# Patient Record
Sex: FEMALE | Race: WHITE | NOT HISPANIC OR LATINO | Employment: FULL TIME | URBAN - METROPOLITAN AREA
[De-identification: names, ages, dates, MRNs, and addresses within clinical notes are randomized per-mention and may not be internally consistent; named-entity substitution may affect disease eponyms.]

---
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
## 2024-01-10 ENCOUNTER — VIRTUAL VISIT (OUTPATIENT)
Dept: PSYCHIATRY | Facility: CLINIC | Age: 19
End: 2024-01-10
Attending: PSYCHIATRY & NEUROLOGY
Payer: COMMERCIAL

## 2024-01-10 DIAGNOSIS — F41.1 GENERALIZED ANXIETY DISORDER: ICD-10-CM

## 2024-01-10 DIAGNOSIS — F33.42 MAJOR DEPRESSIVE DISORDER, RECURRENT, IN FULL REMISSION (H): Primary | ICD-10-CM

## 2024-01-10 PROBLEM — F41.9 ANXIETY: Status: ACTIVE | Noted: 2021-03-25

## 2024-01-10 PROBLEM — F33.1 MAJOR DEPRESSIVE DISORDER, RECURRENT, MODERATE (H): Status: ACTIVE | Noted: 2021-01-13

## 2024-01-10 PROBLEM — F32.A DEPRESSION, UNSPECIFIED DEPRESSION TYPE: Status: ACTIVE | Noted: 2021-01-13

## 2024-01-10 PROCEDURE — 90792 PSYCH DIAG EVAL W/MED SRVCS: CPT | Mod: 95

## 2024-01-10 RX ORDER — HYDROXYZINE PAMOATE 25 MG/1
25-50 CAPSULE ORAL 2 TIMES DAILY PRN
Qty: 120 CAPSULE | Refills: 3 | Status: SHIPPED | OUTPATIENT
Start: 2024-01-10

## 2024-01-10 RX ORDER — VENLAFAXINE HYDROCHLORIDE 150 MG/1
150 CAPSULE, EXTENDED RELEASE ORAL DAILY
Qty: 30 CAPSULE | Refills: 3 | Status: SHIPPED | OUTPATIENT
Start: 2024-01-10 | End: 2024-05-22

## 2024-01-10 ASSESSMENT — PATIENT HEALTH QUESTIONNAIRE - PHQ9
SUM OF ALL RESPONSES TO PHQ QUESTIONS 1-9: 9
10. IF YOU CHECKED OFF ANY PROBLEMS, HOW DIFFICULT HAVE THESE PROBLEMS MADE IT FOR YOU TO DO YOUR WORK, TAKE CARE OF THINGS AT HOME, OR GET ALONG WITH OTHER PEOPLE: SOMEWHAT DIFFICULT

## 2024-01-10 ASSESSMENT — ANXIETY QUESTIONNAIRES
6. BECOMING EASILY ANNOYED OR IRRITABLE: SEVERAL DAYS
IF YOU CHECKED OFF ANY PROBLEMS ON THIS QUESTIONNAIRE, HOW DIFFICULT HAVE THESE PROBLEMS MADE IT FOR YOU TO DO YOUR WORK, TAKE CARE OF THINGS AT HOME, OR GET ALONG WITH OTHER PEOPLE: SOMEWHAT DIFFICULT
2. NOT BEING ABLE TO STOP OR CONTROL WORRYING: MORE THAN HALF THE DAYS
1. FEELING NERVOUS, ANXIOUS, OR ON EDGE: MORE THAN HALF THE DAYS
GAD7 TOTAL SCORE: 9
7. FEELING AFRAID AS IF SOMETHING AWFUL MIGHT HAPPEN: NOT AT ALL
5. BEING SO RESTLESS THAT IT IS HARD TO SIT STILL: NOT AT ALL
4. TROUBLE RELAXING: MORE THAN HALF THE DAYS
7. FEELING AFRAID AS IF SOMETHING AWFUL MIGHT HAPPEN: NOT AT ALL
8. IF YOU CHECKED OFF ANY PROBLEMS, HOW DIFFICULT HAVE THESE MADE IT FOR YOU TO DO YOUR WORK, TAKE CARE OF THINGS AT HOME, OR GET ALONG WITH OTHER PEOPLE?: SOMEWHAT DIFFICULT
IF YOU CHECKED OFF ANY PROBLEMS ON THIS QUESTIONNAIRE, HOW DIFFICULT HAVE THESE PROBLEMS MADE IT FOR YOU TO DO YOUR WORK, TAKE CARE OF THINGS AT HOME, OR GET ALONG WITH OTHER PEOPLE: SOMEWHAT DIFFICULT
4. TROUBLE RELAXING: MORE THAN HALF THE DAYS
6. BECOMING EASILY ANNOYED OR IRRITABLE: SEVERAL DAYS
GAD7 TOTAL SCORE: 9
GAD7 TOTAL SCORE: 9
2. NOT BEING ABLE TO STOP OR CONTROL WORRYING: MORE THAN HALF THE DAYS
GAD7 TOTAL SCORE: 9
3. WORRYING TOO MUCH ABOUT DIFFERENT THINGS: MORE THAN HALF THE DAYS
5. BEING SO RESTLESS THAT IT IS HARD TO SIT STILL: NOT AT ALL
1. FEELING NERVOUS, ANXIOUS, OR ON EDGE: MORE THAN HALF THE DAYS
7. FEELING AFRAID AS IF SOMETHING AWFUL MIGHT HAPPEN: NOT AT ALL
3. WORRYING TOO MUCH ABOUT DIFFERENT THINGS: MORE THAN HALF THE DAYS

## 2024-01-10 ASSESSMENT — PAIN SCALES - GENERAL: PAINLEVEL: MILD PAIN (2)

## 2024-01-10 NOTE — PROGRESS NOTES
Virtual Visit Details    Type of service:  Video Visit     Originating Location (pt. Location): Other Safely parked car.   Distant Location (provider location):  On-site  Platform used for Video Visit: Edita

## 2024-01-10 NOTE — LETTER
January 10, 2024      Luz Webber  [address] [facility] [address]        {Comm Man dear:[id number]}          Sincerely,        Kenneth Thompson MD

## 2024-01-10 NOTE — PROGRESS NOTES
"   [facility]  NEW PATIENT EVALUATION     CARE TEAM:    PCP- Yen Jauregui  Therapist- None    Luz is a 18 year old who uses the pronouns she, her, hers.      Chief Concern     \"I'm comfortable with my meds currently, I need someone to prescribe them\"      Diagnoses     Major depressive disorder, recurrent, in full remission with seasonal pattern    NILS      Assessment     Luz Webber is a 19 yo with past psychiatric diagnosis of depression and anxiety who had been seen at the [facility] clinic, most recently was working with Dr. Low and is here today to establish MH care with adult Psychiatry after being discharged from [facility] to PCP who seems was not comfortable managing psychotropic medications, eventually referred by Dr. Hanley from [facility].     Overall appears that Veronique's depressive symptoms have been in remission over the past months and attributes this to her current medication regimen, which appears she is tolerating well without noticing any side effects.   She notices today that mood changes has been more noticeable during the winter season and we discussed recommendation of using her SAD lamp (has one at home) which she had not done this season.     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 It appears that hydroxyzine as needed has been helpful although causes some somnolence, recommended to decrease dose and agrees with recommendation. We discussed long-term use of hydroxyzine not being ideal (due to cumulative anticholinergic burden) and need may indicate underlying anxiety and that there may be room for optimizing antidepressant medication to better target anxiety.  We also discussed limiting caffeine intake and THC gummies use (see details below) as this could " contribute to heightened anxiety as well. She is open to continue this conversation in the future and politely declined resources at this time.     We discussed medication indications, risks vs benefits, side effects and alternatives including optimizing venlafaxine to better target anxiety and she expresses preference of not making medication changes today, except for decreasing hydroxyzine as she had been taking on average 50 mg BID only. Veronique and is open to continue conversation around medication changes in the near future as needed.     We discussed role of psychotherapy and this having best evidence in treatment of anxiety and depression in combination with medications and she expressed interest in engaging in psychotherapy (CBT to target anxiety and depression).   No other questions or concerns.       Future Considerations:  -Consider optimizing Venlafaxine to 225 if depressive symptoms were to re-emerge.   -Hydroxyzine has been helpful for anxiety and if this was to worsen, consider using propranolol, gabapentin or Buspar.    Psychotropic Drug Interactions:  [PSYCHCLINICDDI]  ADDITIVE SEROTONERGIC: Sumatriptan, Venlafaxine  Management: routine monitoring and patient aware of risks     MNPMP was not checked today: not using controlled substances    Risk Statements:   Treatment Risk- Risks, benefits, alternatives and potential adverse effects have been discussed and are understood.   Safety Risk-Luz did not appear to be an imminent safety risk to self or others.     Plan     1) Medications:   - Continue venlafaxine 150 mg daily  - Decrease hydroxyzine to 25-50 mg PO BID PRN, anxiety      Other:   -OTC. Vitamin D supplementation (50 mcg daily) for Vitamin D insufficiency   -Sumatriptan 50 mg PO may repeat q 2 hrs, max of 4 tabs in 24 hrs. Prescribed by PCP.     2) Psychotherapy:   None currently, indicated individual therapy for CBT with focus on anxiety/mood    3) Next due:  Labs- Routine monitoring is      Cheif complaints and Diagnosis: liver cirrhosis w/ ascites/ C.diff colitis     Subjective: patient weak, sleepy; poor oral intake; denies fever. diarrhea improved.  Marked reduction in oral intake and energy. More lethargic. Not tolerating PO medications well. requiring IV anti-emetics to be able to administer oral medications.     Review of Systems: 14 Point review of systems reviewed and reported as negative unless otherwise stated in Subjective      PHYSICAL EXAM:    T(C): 36.3 (21 @ 08:18), Max: 36.3 (21 @ 08:18)  HR: 74 (21 @ 08:18) (71 - 79)  BP: 117/52 (21 @ 08:18) (93/44 - 117/52)  RR: 16 (21 @ 08:18) (16 - 16)  SpO2: 92% (21 @ 08:18) (91% - 92%)  General: AAOx1; NAD; Frail appearing; Lethargic  Head: AT/NC  ENT: Moist Mucous Membranes; No Injury  Eyes: EOMI; PERRL  Neck: Non-tender; No JVD  CVS: RRR, S1&S2, No murmur, No edema  Respiratory: Lungs CTA B/L; Normal Respiratory Effort  Abdomen/GI: Soft, mild tenderness ascites, no guarding, no rebound, normal bowel sounds  : No bladder distention, No Rose  Extremites: No cyanosis, No clubbing, No edema  MSK: Diffuse muscle atrophy  Neuro: AAOx1, Limited exam due to lethargy  Psych: Unable to evaluate  Skin: Clean, Dry and Intact    MEDICATIONS  (STANDING):  aMIOdarone    Tablet 200 milliGRAM(s) Oral daily  lactulose Syrup 10 Gram(s) Oral daily  LORazepam   Injectable 0.25 milliGRAM(s) IV Push every 12 hours  metroNIDAZOLE  IVPB      metroNIDAZOLE  IVPB 500 milliGRAM(s) IV Intermittent every 8 hours  pantoprazole  Injectable 40 milliGRAM(s) IV Push daily  prochlorperazine   Injectable 10 milliGRAM(s) IV Push every 8 hours  rifAXIMin 550 milliGRAM(s) Oral two times a day  vancomycin    Solution 500 milliGRAM(s) Oral every 6 hours    MEDICATIONS  (PRN):  acetaminophen  Suppository .. 650 milliGRAM(s) Rectal every 8 hours PRN Temp greater or equal to 38C (100.4F)  aluminum hydroxide/magnesium hydroxide/simethicone Suspension 30 milliLiter(s) Oral every 4 hours PRN Dyspepsia  morphine  - Injectable 2 milliGRAM(s) IV Push every 4 hours PRN Severe Pain (7 - 10)  ondansetron Injectable 4 milliGRAM(s) IV Push every 6 hours PRN Nausea and/or Vomiting      Urinalysis Basic - ( 2021 05:10 )    Color: Yellow / Appearance: Clear / S.015 / pH: x  Gluc: x / Ketone: Trace  / Bili: Small / Urobili: Negative mg/dL   Blood: x / Protein: 30 mg/dL / Nitrite: Negative   Leuk Esterase: Trace / RBC: 0-2 /HPF / WBC 3-5   Sq Epi: x / Non Sq Epi: x / Bacteria: Many                          8.0    30.93 )-----------( 346      ( 2021 08:46 )             25.2     11    143  |  118<H>  |  56<H>  ----------------------------<  126<H>  3.4<L>   |  17<L>  |  1.82<H>    Ca    7.8<L>      2021 08:46  Phos  4.6       Mg     2.0         TPro  x   /  Alb  1.7<L>  /  TBili  x   /  DBili  x   /  AST  x   /  ALT  x   /  AlkPhos  x       COVID-19 PCR: NotDetec (2021 15:12)  COVID-19 PCR: NotDetec (2021 06:50)  COVID-19 PCR: NotDetec (2021 19:00)  COVID-19 PCR: NotDetec (29 Oct 2021 15:30)  COVID-19 PCR: NotDetec (26 Oct 2021 05:00)  COVID-19 PCR: NotDetec (21 Oct 2021 17:08)  COVID-19 PCR: NotDetec (27 May 2021 22:25)    CAPILLARY BLOOD GLUCOSE                                8.0    28.52 )-----------( 395      ( 2021 11:16 )             24.2     11-    145  |  118<H>  |  55<H>  ----------------------------<  155<H>  3.6   |  17<L>  |  1.88<H>    Ca    8.0<L>      2021 12:36  Phos  4.5       Mg     2.0         TPro  6.9  /  Alb  2.1<L>  /  TBili  0.5  /  DBili  x   /  AST  37  /  ALT  26  /  AlkPhos  173<H>      COVID-19 PCR: NotDetec (2021 15:12)  COVID-19 PCR: NotDetec (2021 06:50)  COVID-19 PCR: NotDetec (2021 19:00)  COVID-19 PCR: NotDetec (29 Oct 2021 15:30)  COVID-19 PCR: NotDetec (26 Oct 2021 05:00)  COVID-19 PCR: NotDetec (21 Oct 2021 17:08)  COVID-19 PCR: NotDetec (27 May 2021 22:25)    CAPILLARY BLOOD GLUCOSE          Culture - Urine (collected 2021 05:30)  Source: Catheterized Catheterized  Final Report (2021 07:53):    <10,000 CFU/mL Normal Urogenital [place]        2021 09:44    141    |  113    |  57     ----------------------------<  132    3.6     |  18     |  1.98     Ca    8.1        2021 09:44    TPro  6.9    /  Alb  2.1    /  TBili  0.5    /  DBili  x      /  AST  37     /  ALT  26     /  AlkPhos  173    2021 09:44  LIVER FUNCTIONS - ( 2021 09:44 )  Alb: 2.1 g/dL / Pro: 6.9 gm/dL / ALK PHOS: 173 U/L / ALT: 26 U/L / AST: 37 U/L / GGT: x         PT/INR - ( 2021 19:24 )   PT: 18.6 sec;   INR: 1.64 ratio         PTT - ( 2021 19:24 )  PTT:36.8 secCBC Full  -  ( 2021 09:44 )  WBC Count : 24.08 K/uL  Hemoglobin : 8.6 g/dL  Hematocrit : 26.5 %  Platelet Count - Automated : 416 K/uL  Mean Cell Volume : 96.0 fl  Mean Cell Hemoglobin : 31.2 pg  Mean Cell Hemoglobin Concentration : 32.5 gm/dL          PT/INR - ( 2021 19:24 )   PT: 18.6 sec;   INR: 1.64 ratio         PTT - ( 2021 19:24 )  PTT:36.8 sec        Assessment and Plan:        84 y/o Female PMHx of cirrhosis presents with nausea, vomiting and diarrhea. History provided by  as patient was too tired to speak. Patient was recently discharged last week from the hospital. Reports she has been having multiple episode of diarrhea daily. Reports recent antibiotics for uti. She has not had anything to eat or drink in the last 3 days. Also reports increased abdominal distention. Denies fever/chills, Chest pain, SOB, rectal bleeding, LE swelling, or other complaints at this time. As per the  she did not eat nothing for almost 2 days. She has not have any bowel movement since she has arrived in the hospital.       1.Decompensated Cirrhosis of Liver with ascites, undiagnosed cause.  Hyperammonemia  -STARTED Xifaxan  -nursing can given lactulose if no diahrrea  -patient aaox3 on admission->marked worsening lethargy and functional status with associated acute metabolic encephalopathy due to malnutrition, hyperammonia, and functional decline.   -no abdominal pain on my exam since admission ; patient just very weak; deistention +. ill  -Order Us and therapeutic/palliative parcentesisi.  -Hyperammonia; Rifaxamin started. balanc of maintaining bowel movement but not to loose or too frequent because of C Diff infection.       2-black stools reported On oral iron.   -as per nursing 2 black stools  -monitor H+H >> stable   -high risk for procedure, recco conservation care.     3-C.diff colitis  -start oral vancomycin.  -ID on board      Acute Kidney Injury  -most likely due to Dehydration   -unlikely to be Hepatorenal condition   -IVF as needed for fluid balance.   -will follow Cr level and Is/Os  -follow Nephrology consult. Hold diurectic as aptient also with poor oral intake.     h/o A fib  -on Amiodarone and Xarelto. Xarelto on hold for suspected GIB.     Gluteal hematoma. stable  -improved      SCDs for DVT ppx    Chronic Leucocytosis  -stable range of WBC    Code status: Patient is DNR/DNI. Comfort Care/Hospice requested effective 2021. MOLST form is in the chart.     Failure to thrive  -patient has severe failure to thrive, and severe malnutrition  -she is dnr/  dni however no limitations on treatment. MOLST in chart.     treatment for C Diff for symptomatic relief. Over the course of the this hospitalization, progressive cognition and functional decline. More lethargic. Poor oral intake. Increased nausea. Intermittiently unable to tolerated PO and that is progressively worsening. . Requiring IV Compazine to barely tolerated PO. Started IV Flagyl in lieu of not tolerating PO and possibly missing PO vancomycin doses. Restarting lactulose as tolerated to treat hyperammonia. IV ativan for anxiety. Given the dramatic decline over the last several days, there is an expected demise sooner than later. Tolerating less PO and now requiring parental anti-emetic to get necessary oral medications to treat acute infection. Had extensive discussion with  and daughter. Will be discussing with the family to transition to hospice care. Inpatient hospice notified of rapid expected decline and they will be reviewing eligibility. I anticipate if she goes to home hospice, she will require a transition to inpatient hospice in a possibility of a matter of days. which would make the multiple transitions not very realistic and/or comfortable.  Further discussion ->instructed to execute comfort care measures and request hospice care. Patient now requiring more IV ativan for comfort. Unable to take oral medications morning of . Referral for inpatient hospice requested.        not indicated for current psychotropic medication regimen   EKG- Routine monitoring is not indicated for current psychotropic medication regimen   Rating scales-  PHQ9, GAD7     4) Referrals:   Internal referral for CBT psychotherapy to target anxiety, mood. Intake pool alerted via Epic message.     5) Other:   -Recommended continue working with PCP for migraine management/ref to Neuro.   -Start using light box as discussed.  Update: Reached out to PCP re: continue prescribing psychotropic medications and have not heard back from them. Case was discussed with CAT2 team and it was determined that patient is appropriate for care in this clinic with the BCT team, has an upcoming appointment with Dr. Boucher on 2/7/24.       6) Follow-up: Return to clinic in 4-8 weeks with BCT team, Pending discussion in CAT2 team meeting.        Pertinent Background                                                   [most recent eval 01/09/24]     The following information was copied/edited form previous notes and reviewed with Luz today:   Luz reports that she has experienced anxiety her entire life, but symptoms noticeably worsened around middle school, at a time where she had some changes in friend groups.  She first sought therapy in October 2020, where she was diagnosed with depression and anxiety.  Both she previously thought were normally experienced by everyone.  In February of 2021, her PCP prescribed her sertraline for anxiety. Was not tolerated due to GI side effects, and was switched to fluoxetine 20 mg after 1 month.   In April and June 2021, fluoxetine was titrated up to 30 mg and 40 mg respectively as it was only partially effective for anxiety. Mom noticed significant emotional blunting, and pt was tending less to her needs (messy room, forgot to feed gecko and it passed away). Pt reported symptoms of a major depressive episode from that time. Prozac was decreased back to 30 mg, and symptoms improved although  "continued not providing adequate benefit for anxiety and depression. In 2021, was cross-titrated to venlafaxine and current dose of150 mg has worked best for her.   She was seen at the [facility] clinic since October 2021 to June 2023 and transferred to PCP/adult MH.     Pertinent items include: [N/A]     Subjective       Goals for today's visit:  \"I'm comfortable with my meds currently, I need someone to prescribe them\"   -Shares does not want to change current medication regimen as this has worked best    -Mood: \"Stable\"   -Winter season is hard in terms of not having enough recreational activities/time used to play hockey in the past, now work is primary stress.  -Happy with her job, but can be understandably stressful.  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   -Seasonal pattern to mood: \"I think past 2 years has been the case\" Not so much in the past as she had been more active during hockey season.   -Has a SAD lamp, has not used it since last winter, shares will start use   -Has been re-considering engaging in therapy in the past months   Internal, referral for psychotherapy     -Anxiety:  \"better now,\" \"definitely waves, most recently with the holidays was high\"   Gets tired, exhausting, sometimes gets headaches. Overall \"pushing through.\"   Experiences anxiety as: Mild SOB, Increased Increased HR, ~110-120 bpm, baseline HR is in the 70s range (apple watch). Notices is usually in the mid-afternoon or in the evening. A bit of hand tremors.  Hydroxyzine helps most of the time.  Panic attacks: Reports happened more in HS, shorter 10 min episodes of \"intense anxiety symptoms.\" After 10 min was more exhausted, some trailing anxiety. More after holding in my anxiety at the end of the day, never in public. Last time experience the above: Approximately in the fall. \"Not often anymore.\"     -Appetite: Ok, good.   -Energy: Generally low in the winter season  -Sleep: Usually wakes up 5:30 AM, goes to bed ~mid-night. Takes " daily naps form 1-3 pm.   -Has back pain due to past sports injuries.   -Difficulty with initiating sleep sometimes.     -Tremor/abnormal movements: Denies   -Side effects? Chest pain, palpitations, sleep disturbances, Other side effects:   -Perceptual disturbances: Denies   -Safety: Denies SI/plan/intent, denies HI or other safety concerns      Recent Psych Symptoms:   Depression:  low energy, insomnia, and hypersomnia  Elevated:  none  Psychosis:  none  Anxiety:  excessive worry although this has been better after the holidays. Currently denies worriees out of the ordinary   Trauma Related:  None. Denies hx of trauma.  Other:  No    Current Social History:  Financial/occupational: Self-employed. Ownes a coffee shop, has 7 employees.  Living situation (partner, children, pets, etc):  Lives at parents home. Has an older (half-sister, 26 yo, finishing grad school in psychology, sex therapist) and a younger sister (15 yo). Lives with bio mom and dad and younger sisters.   Social/spiritual support: sisters, mom, some friends.   Feels safe at home: Yes    Pertinent Substance Use:   Alcohol: Yes: Casual glass of wine with family during the holidays.    Cannabis: Yes: 1 THC gummy (5 mg) once a week, given by her dad. Denies problematic use, cravings, noticing impact in mood or anxiety or other concerns currently. Continue to monitor closely/offer resources as needed.   Tobacco: No  Caffeine:  Yes: Drinks 4 cups a day. Latest has a cup is ~7-8 pm. Recommended to avoid after noon-2 pm    Opioids: No   Narcan Kit current: No  Other substances: none    Medical Review of Systems:   Lightheadedness/orthostasis: None  Headaches: Migraine headaches. Also reports runs in her family. Gets 3-5 episodes/month. Started after concussion in HS when playing hockey. LOC for ~5 seconds once. Never imaging or needing to go to the hospital. No seizure hx.  GI: none  Sexual health concerns: Denies being sexually activive     A comprehensive  "review of systems was performed and is negative other than noted above.    Contraception: None     Mental Status Exam     Alertness: alert  and oriented  Appearance: well groomed  Behavior/Demeanor: cooperative, pleasant, and calm, with good  eye contact   Speech: regular rate and rhythm  Language: intact and no problems  Psychomotor: normal or unremarkable  Mood: \"stable\"   Affect: full range; congruent to: mood- yes, content- yes  Thought Process/Associations: unremarkable  Thought Content:  Reports none;  Denies suicidal & violent ideation and delusions, delusions , obsessions , phobia , magical thinking, over-valued ideas, and paranoid ideation  Perception:  Reports none;  Denies auditory hallucinations, visual hallucinations, visual distortion seen as shadows , depersonalization, and derealization  Insight: good  Judgment: good  Cognition: does  appear grossly intact; formal cognitive testing was not done  Gait and Station: N/A (telehealth)     Social History                                 pt reported     Financial/employment: 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   Living situation/family: mother (Radha), father and younger sister. Gets along well with family members. Also has older half-sister who she is close to.   Children: No  Social/spiritual support: See above for current.  Education: Completed HS, Completed PSEO at [employer]. Reports getting \"decent\" grades, in good academic standing. No history of 504 or IEP.   Early history:   Raised by: biological parents   Legal:  Denies      Family Mental Health History                                 pt reported     Mother: MDD, anxiety  Dad: Seasonal depression, anxiety.   Denies hx of bipolar disorder or psychotic disorders   Paternal  grandparent some \"heart issues.      Past Psychiatric History       Self injurious behavior [method, most recent]: No  Suicide attempt [#, most recent, method]: No  Suicidal ideation hx [passive, " active]: No    Violence/Aggression Hx:No   Psychosis Hx: No   Eating Disorder Hx: No  Trauma hx: No    Psych Hosp [#, most recent]: No   Commitment: No   TMS/ECT: No   Outpatient Programs [Day treatment, DBT, eating disorder tx, etc]: No    SUBSTANCE USE HISTORY   Past Use: No  Treatment [#, most recent]: No   Medical Consequences: No   Legal Consequences: No      Past Psych Med Trials        Medication Max Dose (mg) Dates / Duration Helpful? DC Reason / Adverse Effects?   Sertraline   For 1 month Feb 2020  N caused GI upset, discontinued    Fluoxetine   Mar - Oct 2020  Partially  caused nausea, only partially helpful for anxiety   Venlafaxine 150 mg  October 2021-present  Y       Vitals   There were no vitals taken for this visit.  Pulse Readings from Last 3 Encounters:   09/15/23 97   05/26/23 101   12/16/21 72     Wt Readings from Last 3 Encounters:   09/15/23 53.4 kg (117 lb 11.2 oz) (30%, Z= -0.53)*   05/26/23 51.7 kg (113 lb 14.4 oz) (23%, Z= -0.73)*   12/16/21 50.8 kg (112 lb) (25%, Z= -0.67)*     * Growth percentiles are based on CDC (Girls, 2-20 Years) data.     BP Readings from Last 3 Encounters:   09/15/23 118/77   05/26/23 137/88   12/16/21 120/82 (83%, Z = 0.95 /  96%, Z = 1.75)*     *BP percentiles are based on the 2017 AAP Clinical Practice Guideline for girls        Medical History     ALLERGIES: Patient has no known allergies.    Patient Active Problem List   Diagnosis    Acne vulgaris    Anxiety    Depression, unspecified depression type    Encounter for preventive care    Spondylolysis    Migraine with aura and with status migrainosus, not intractable    Medication monitoring encounter    Kyphosis (acquired) (postural)        Medications     Current Outpatient Medications   Medication Sig Dispense Refill    adapalene (DIFFERIN) 0.1 % external cream [ADAPALENE (DIFFERIN) 0.1 % CREAM] Apply to affected area nightly 45 g 1    cholecalciferol, vitamin D3, (VITAMIN D3 ORAL) [CHOLECALCIFEROL, VITAMIN  "D3, (VITAMIN D3 ORAL)] Take by mouth.      hydrOXYzine (VISTARIL) 25 MG capsule Take 2-3 capsules (50-75 mg) by mouth 2 times daily as needed for anxiety 60 capsule 1    SUMAtriptan (IMITREX) 50 MG tablet Take 1 tablet (50 mg) by mouth at onset of headache for migraine May repeat in 2 hours. Max 4 tablets/24 hours. 12 tablet 3    venlafaxine (EFFEXOR XR) 150 MG 24 hr capsule Take 1 capsule (150 mg) by mouth daily 30 capsule 0        Labs and Data         6/27/2023     1:50 PM   PROMIS-10 Total Score w/o Sub Scores   PROMIS TOTAL - SUBSCORES 23          No data to display                  5/26/2023     3:04 PM 6/27/2023     1:49 PM 9/15/2023     1:13 PM   PHQ-9 SCORE   PHQ-9 Total Score MyChart 6 (Mild depression) 8 (Mild depression) 7 (Mild depression)   PHQ-9 Total Score 6 8 7         6/9/2021     3:00 PM 6/29/2021     1:00 PM 5/26/2023     3:05 PM   NILS-7 SCORE   Total Score   10 (moderate anxiety)   Total Score 10 11 10       Liver/Kidney Function, TSH Metabolic Blood counts   Recent Labs   Lab Test 09/15/23  1405 06/01/23  1051   AST 24 20   ALT 16 16   ALKPHOS 54 61   CR 0.70 0.78     Recent Labs   Lab Test 03/19/21  1530   TSH 1.31    Recent Labs   Lab Test 06/01/23  1051   CHOL 179*   TRIG 64   LDL 97   HDL 69     No lab results found.  Recent Labs   Lab Test 09/15/23  1405   GLC 89    Recent Labs   Lab Test 09/15/23  1405   WBC 5.2   HGB 13.7   HCT 39.0   MCV 82             October/2021 ECG  QTc = 463 ms, followed by 12-day Holer study for \"irregular heartbeat\" with normal results, per records.          PROVIDER: Kenneth Thompson MD    Level of Medical Decision Making:   - At least 1 chronic problem that is not stable  - Engaged in prescription drug management during visit (discussed any medication benefits, side effects, alternatives, etc.)      Patient staffed in clinic with Dr. Peterson who will sign the note.  Supervisor is Dr. Schmitz.    "

## 2024-01-10 NOTE — NURSING NOTE
Is the patient currently in the state of [place]? YES    Visit mode:VIDEO    If the visit is dropped, the patient can be reconnected by: VIDEO VISIT: Text to cell phone:   Telephone Information:   Mobile [phone]       Will anyone else be joining the visit? NO  (If patient encounters technical issues they should call [phone]    How would you like to obtain your AVS? MyChart    Are changes needed to the allergy or medication list? No    Reason for visit: Consult    Raeann GARSIA

## 2024-01-10 NOTE — Clinical Note
Ephraim Jauregui, Dr. Thompson and I saw Veronique today for a new eval as she gradutated from the child psychiatry clinic. We noted that she is on a stable long term med regimen consisting of just venlafaxine and hydroxyzine which she uses infrequently. The previous team had recommended that she was no longer in need of dedicated psychiatric care and we agree with this recommendation. Generally with patients that are stable over the long term and without complicated meds, we recommend that PCP take over the prescribing which allows us to see more complicated patients in our specialty clinic.   We support a collaborative care model to help support PCPs during this transition. We provide contingency recommendations and are available to contact for up to a year after the pts last visit, and if any major issues occur, the patient is invited to re-establish in our clinic.   Additionally, Veronique would prefer to continue with you in this capacity if possible.  Please let us know if you can accommodate this request.

## 2024-01-10 NOTE — PATIENT INSTRUCTIONS
**For crisis resources, please see the information at the end of this document**   Patient Education    Thank you for coming to the [facility] MENTAL HEALTH & ADDICTION [place] CLINIC.     Lab Testing:  If you had lab testing today and your results are reassuring or normal they will be mailed to you or sent through [employer] within 7 days. If the lab tests need quick action we will call you with the results. The phone number we will call with results is # [phone]. If this is not the best number please call our clinic and change the number.     Medication Refills:  If you need any refills please call your pharmacy and they will contact us. Our fax number for refills is [phone].   Three business days of notice are needed for general medication refill requests.   Five business days of notice are needed for controlled substance refill requests.   If you need to change to a different pharmacy, please contact the new pharmacy directly. The new pharmacy will help you get your medications transferred.     Contact Us:  Please call [phone] during business hours (8-5:00 M-F).   If you have medication related questions after clinic hours, or on the weekend, please call [phone].     Financial Assistance [phone]   Medical Records [phone]       MENTAL HEALTH CRISIS RESOURCES:  For a emergency help, please call 911 or go to the nearest Emergency Department.     Emergency Walk-In Options:   EmPATH Unit @ [facility] ([place]): [phone] - Specialized mental health emergency area designed to be calming  [facility] ([place]): [phone]  [facility] Acute Psychiatry Services ([place]): [phone]  [facility]): [phone]    [facility] Crisis Information:   [place]: [phone]  [place]: [phone]  [place] ([place]) - Adult: [phone]     Child: [phone]  [place] - Adult: [phone]     Child: [phone]  [place]:  [phone]  List of all [facility] resources:   https://[place].gov/dhs/people-we-serve/adults/health-care/mental-health/resources/crisis-contacts.jsp    National Crisis Information:   Crisis Text Line: Text  [place]  to 034774  Suicide & Crisis Lifeline: 988  National Suicide Prevention Lifeline: 4-206-214-TALK (1-278.780.5227)       For online chat options, visit https://suicidepreventionlifeline.org/chat/  Poison Control Center: 7-233-265-8250  Trans Lifeline: 8-561-775-9152 - Hotline for transgender people of all ages  The Kt Project: 6-737-439-5321 - Hotline for LGBT youth     For Non-Emergency Support:   Fast Tracker: Mental Health & Substance Use Disorder Resources -   https://www.[employer] Traderck[employer] my eyesn.org/

## 2024-01-23 ENCOUNTER — VIRTUAL VISIT (OUTPATIENT)
Dept: PSYCHIATRY | Facility: CLINIC | Age: 19
End: 2024-01-23
Attending: PSYCHOLOGIST
Payer: COMMERCIAL

## 2024-01-23 DIAGNOSIS — F33.41 RECURRENT MAJOR DEPRESSIVE DISORDER, IN PARTIAL REMISSION (H): Primary | ICD-10-CM

## 2024-01-23 DIAGNOSIS — F41.1 GAD (GENERALIZED ANXIETY DISORDER): ICD-10-CM

## 2024-01-23 PROCEDURE — 90791 PSYCH DIAGNOSTIC EVALUATION: CPT | Mod: 95

## 2024-01-23 ASSESSMENT — PATIENT HEALTH QUESTIONNAIRE - PHQ9
SUM OF ALL RESPONSES TO PHQ QUESTIONS 1-9: 10
10. IF YOU CHECKED OFF ANY PROBLEMS, HOW DIFFICULT HAVE THESE PROBLEMS MADE IT FOR YOU TO DO YOUR WORK, TAKE CARE OF THINGS AT HOME, OR GET ALONG WITH OTHER PEOPLE: SOMEWHAT DIFFICULT
SUM OF ALL RESPONSES TO PHQ QUESTIONS 1-9: 10

## 2024-01-23 NOTE — Clinical Note
"I wonder if the pt meets\" partial remission\" of MDD or MDD recurrent?? Id like to ask you about this at the next supervision"

## 2024-01-25 NOTE — PROGRESS NOTES
"Department of Psychiatry   [facility]  Non-Medical Diagnostic Evaluation      Date of Service: Jan 23, 2024  Time of interview with patient: Start Time: 08:30 Stop Time: 09:30  Provider:  Genoveva Duoglas MA, BERNARDO, Zahra practicum student   Psychiatrist: None  PCP: Yen Jauregui  Other Providers: Psychiatrist Dr. Kate Calhoun  Referred by Dr. Kate Calhoun    Telehealth Video Visit AddOn  Type of service: Telemedicine Office Visit for psychological Diagnostic evaluation .  The patient's condition can be safely assessed and treated via synchronous audio and visual telemedicine encounter.    Mode of Communication:  Video Conference via  StepUp  Reason for Telemedicine Visit: Patient has requested telehealth visit   Originating Site (Patient Location): Patient's home  Distant Site (Provider Location): [facility]: [facility] Adult Psychiatry [facility]  Consent:  The patient has verbally consented to: the potential risks and benefits of telemedicine versus in person care with special emphasis on confidentiality given family members in the home.  Attestation: As the provider I attest to compliance with applicable laws and regulations related to telemedicine.     Identifying Data:  Luz Webber is a 18 year old female who prefers the name of \"Veronique\" & pronouns she, her, hers, herself.     Sources of Information: gathered by clinical interview, self-report forms, and chart review.  The patient was a good historian.    Encounter Diagnoses   Name Primary?    Recurrent major depressive disorder, in partial remission (H24) Yes    Generalized anxiety disorder with panic attacks      CHIEF COMPLAINT     \"I experience anxiety throughout the day and have difficulty with managing it. I don't know what triggers anxiety or what causes the anxiety. I have depression as well, which is 'okay' compared to anxiety\"      HISTORY OF PRESENT ILLNESS        The pt explained that she started having the symptoms " of depression and anxiety when she was in middle school. The parents noticed the pt's struggle in 2020 when she was age 15 and took her to see their family practice provider. The pt started an antidepressant prescribed by her family practice provider initially, then, started individual psychotherapy. The pt reported that her anxiety and depression were mostly related to her academic and sports performance. The pt also stated that she had social anxiety with panic attacks back then. The pt worked with a CBT psychotherapist from 2020 to 2021. The pt stated that the therapy was effective for her to learn coping skills. 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 The pt has experienced increased anxiety symptoms for the past few months. She stated that she does not know what triggers anxiety currently, but her relationship with her older step-sister may be contributing. The pt explained that she looks up to her older step-sister and wants to have active relationship, however, her step-sister is not actively participating in their relationship lately. The pt would like to understand what her anxiety is about and learn to better manage symptoms. She reflected that being a business owner can be stressful but she loves her job.     PSYCHIATRIC AND DIAGNOSTIC INTERVIEW     The [employer] Neuropsychiatric Interview was completed to aid in diagnostic assessment.    Depression: The pt meets the diagnostic criteria of Major Depressive Disorder in partial remission. For the past two weeks, Luz Webber reports the symptoms that meets 4 criteria of Major Depressive disorder including lack of interest and less enjoyment the things she used to enjoy, difficulties with staying asleep, low energy, and feeling bad about self. Patient  denied suicide plan and intent.    Sleep: The pt reports difficulty with falling asleep and staying asleep.     Eating Disorder: The pt denies current or past symptoms of eating disorder.     History of Depression (prior episodes): Patient recalls first feeling depressed around age 13. The pt reported that she feels she was not doing well in school both academically and socially. The pt did not share her struggle with anyone at first. The patient's parents noticed her struggle when she was 15 year old. The pt initially was treated with an antidepressant and then worked with a psychotherapist utilizing CBT from 2020 to 2021. The pt found the therapy was helpful to learn coping skills. The pt also stated that working for a coffee shop improved her social skills.     Gina/hypomania: The pt denies current or past symptoms of gina or hypomania.     Panic: The pt reported that her last episode of a panic attack was in high school.   Symptoms include: Patient reports palpitations, diaphoresis (excessive sweating), tremors, GI distress, flushing. Patient denied worries about future attacks, consequences of attacks, and did not report any changes to behavior due to attacks.     Agoraphobia: The pt denies current or past symptoms of agoraphobia.    Social anxiety: The pt denies current symptoms of social anxiety. The pt reported that the peak of her social anxiety was freshman year in high school.     Obsessions: The pt denies current or past symptoms of obsessions.    Compulsions: The pt denies current or past symptoms of compulsion.    Trauma history:  The pt reported that her father had a heart issue that resulted in a serious MVA. The experience (receiving the call, coordinating the logistics of visiting him, taking care of insurance process, the uncertainty of the father's prognosis) was traumatic for her. Does not meet criteria for current PTSD.     Generalized Anxiety: The pt meets the diagnostic criteria of  generalized anxiety disorder. The pt reported excessive worry about several routine things, anxieties and worries present most days, and difficulty controlling worries. During times of anxiety, pt endorsed feeling restless/ on edge, muscle tension, tired/ easily exhausted, mind going blank, irritability, insomnia. The pt shared that her triggers of anxiety are unknown, however, she acknowledges that she is more concerned about others' wellbeing than her own. She also focuses on how her behaviors are seen by others (whether she is disappointing to other people).     Psychosis:  Pt denied delusions, auditory hallucinations with/without commands, and  visual hallucinations, disorganized behavior, disorganized speech (difficulty communicating).    Substance use history:   The pt denies current or past symptoms of substance use disorder.    Alcohol: none    Tobacco (Smoking/Vaping): none    Cannabis: none    Other Illicit Drugs: none    Attention Deficit Hyperactivity Disorder: The pt denies current or past symptoms of ADHD.    Antisocial Personality: The pt denies current or past symptoms of personality disorders.    SAFETY ASSESSMENT     Suicide:  Level of immediate risk: Low  Ideation/Plan/Intent: denies  Risk Factors: Luz Webber has the following risk factors for self-harm severe anxiety   Deterrents: Risk is mitigated by no h/o suicide attempt, no plan or intent, no h/o risky impulsive behavior, no access to lethal means, h/o seeking help when needed, future oriented, none to minimal alcohol use , commitment to family, stable housing, and good job situation .  Self-injurious Behaviors [method, most recent]: none reported  Suicide Attempt [#, recent, method]: none reported    Homicide/Violence:  Assessed level of immediate risk: None  Denies ideation, plan, and intent.    PSYCHIATRIC AND SUBSTANCE USE TREATMENT HISTORY     Current psychiatric medications: hydroxyzine 25mg BID PRN, Effexor XR 15mg QD   Patient  did not report any changes to medications    Current major medical issues: see notes by medical providers.    Psychiatric treatment history:   Psych Inpatient Hospitalizations [#, most recent]: none  ECT [#, most recent]: none  Outpatient Programs [DBT, Day Treatment, Eating Disorder Tx, etc, IOP]: none  Individual Therapy: from 2021 to 2021    Substance use treatment history (e.g., individual/group psychotherapy, antabuse, MAT, residential, AA/NA): none    SOCIAL AND FAMILY HISTORY     Current Living Situation/Family Relationships: Lives with her parents and younger sister  Guns at home?: none  Exercise: minimum. The pt reports her work involves good amount of physical activity  Children: none  Marital status: single  Occupation/ Financial Support: employed as a a small business owner  Cultural/ Social/ Spiritual/ Episcopal Support: The pt identified as cis gender adhikari female. The pt is unsure about the support from her mother about her identity.   Legal History: none    Upbringing: Luz Webber was born and raised - together with her younger sister (5 years younger than the pt) - by both parents in [place], [place]. The pt stated that her parents were around age 19 when she was born. The pt reflected that her parents may have been too young (emotionally and financially not well equipped) to provide what the pt needed while she was growing up. The pt stated that she was left with too much responsibility for her age (e.g., being responsible to take care of her younger sister).    Development: met developmental milestones on time.  Cultural influences: Luz Webber identifies as a adhikari female. Luz Webber reports that to be taken into cultural considerations when providing treatment.  Life Events/Stressors: The pt stated that she is unsure about her stressors.   Head injuries: a couple of times during her school years from hockey  Academic: high school graduate.     Family psychiatric history: The pt is  not aware of any mental health related issues in her family.    MENTAL STATUS EXAM                                                                                       Alertness: alert   Appearance: adequately groomed  Behavior/Demeanor: cooperative, with good  eye contact   Speech: normal Intact. Normal volume, marshal. No prosody.  Language: intact. Preferred language identified as English.  Psychomotor: normal or unremarkable  Mood: anxious  Affect: full range; was congruent to mood; was congruent to content  Thought Process/Associations: unremarkable  Thought Content:  Reports none;  Denies suicidal and violent ideation, delusions, preoccupations, obsessions , phobia , magical thinking, over-valued ideas, and paranoid ideation  Perception:  Reports none;  Denies auditory hallucinations, visual hallucinations, visual distortion seen as shadows , depersonalization, and derealization; intact   Insight: good  Judgment: good  Cognition: (6) does  appear grossly intact; formal cognitive testing was not done  Gait and Station:  unable to assess    ASSESSMENT DATA                                                                                          1/10/2024     7:47 AM 1/10/2024     8:14 AM 1/23/2024     6:41 AM   PHQ   PHQ-9 Total Score 9 9 10   Q9: Thoughts of better off dead/self-harm past 2 weeks Not at all Not at all Not at all      rated by patient as somewhat difficult      5/26/2023     3:05 PM 1/10/2024     8:00 AM 1/10/2024     8:15 AM   NILS-7 SCORE   Total Score 10 (moderate anxiety)  9 (mild anxiety)   Total Score 10 9 9        ASSESSMENT SUMMARY     Luz Webber is a 18 year old single White Not  or  female with psychiatric history of chronic and persistent anxiety and depression who presented for a comprehensive assessment of psychiatric symptoms in the Department of Psychiatry.  Veronique was referred by Dr. Kate Calhoun. Veronique presented today as a good historian. Veronique has  "adequate insight in her current circumstances. Diagnosis of Generalized Anxiety Disorder and Major Depressive Disorder partial remission seem to be supported by patient report, collateral records, and the MINI 7.0.2.  Differential diagnosis of Adjustment Disorder and Social Anxiety Disorder. Further diagnostic clarification is needed. There are medical comorbidities which impact this treatment such as chronic back and shoulder pain.     The patient was first diagnosed and treated for Major Depressive Disorder and Generalized Anxiety Disorder at age 15. Veronique describes her depression as currently better managed than her anxiety symptoms. Primary symptoms include persistent worries on several routine things on almost daily basis, muscle tension, restlessness, and sleep difficulties.     Early childhood is notable for \"needed to grow up fast\" according to the pt. The pt described that she needed to do things well otherwise she would get yelled at by her parents. The pt reported difficulty with socialization during her school years as well as \"perfectionistic tendency\" in her academic and sport performance.      Psychosocial factors impacting treatment include Parent/Child Relationship Difficulties. Psychosocial stressors were identified as family of origin issues, limited social support, mental health symptoms, occupational / vocational stress, and parent-child stress. Patient has evidence of functional impairment including difficulty with home-family and social-friends. More specifically, she is aware that her social support is limited and would like to expand. Goal is to increase her functioning detailed above and assist her to make progress towards her goals.  Patient identified the following strengths that will help her succeed in recovery: has some insight, has family support, is medically insured, has a stable living environment, has mental health providers in place, able to seek help when in crisis, is future " oriented, and is gainfully employed. Things that may interfere with their success include  limited social support .    Starting at age 15, the patient has tried a number of different psychiatric medications and therapy for both depression and anxiety with positive benefit. Patient agrees to treatment with cognitive behavior therapy with the capacity to do so. Agrees to call clinic for any problems. The patient understands to call 911 or come to the nearest ED if life threatening or urgent symptoms present.     Plan     - Weekly cognitive behavioral therapy with writer.  - RTC: in 2 weeks    Genoveva Douglas    I did not see this pt directly. This pt was discussed with me in individual psychotherapy supervision, and I agree with the plan as documented.     Asha Olea, Ph.D., L.P.        Answers submitted by the patient for this visit:  Patient Health Questionnaire (Submitted on 1/23/2024)  If you checked off any problems, how difficult have these problems made it for you to do your work, take care of things at home, or get along with other people?: Somewhat difficult  PHQ9 TOTAL SCORE: 10

## 2024-01-30 NOTE — PATIENT INSTRUCTIONS
It was a pleasure getting to meet you both today. Here is the cross-taper schedule for switching Prozac to Effexor:    Week 1: starting today  - Decrease Prozac to 20 mg daily  - Start Effexor 37.5 mg daily    Week 2:  - Decrease Prozac to 10 mg daily  - Increase Effexor to 75 mg (TWO 37.5 mg capsules) daily    Week 3:  - Discontinue Prozac 10 mg daily  - Increase Effexor to 112.5 mg (THREE 37.5 mg capsules) daily    Week 4+:  - Increase Effexor to 150 mg (FOUR 37.5 mg capsules) daily.    Our initial target dose will be for 150 mg daily. We will see if there's any improvement after 4-6 weeks of being on 150 mg. If needed, we can consider increasing to 187.5 mg or 225 mg daily in the future.    Please do not hesitate to reach out via [employer] if you have any questions or concerns after starting the cross-taper.      **For crisis resources, please see the information at the end of this document**     Patient Education      Thank you for coming to the [facility] MENTAL HEALTH & ADDICTION [place] CLINIC.    Lab Testing:  If you had lab testing today and your results are reassuring or normal they will be mailed to you or sent through [employer] within 7 days. If the lab tests need quick action we will call you with the results. The phone number we will call with results is # [phone] (home) . If this is not the best number please call our clinic and change the number.    Medication Refills:  If you need any refills please call your pharmacy and they will contact us. Our fax number for refills is [phone]. Please allow three business for refill processing. If you need to  your refill at a new pharmacy, please contact the new pharmacy directly. The new pharmacy will help you get your medications transferred.     Scheduling:  If you have any concerns about today's visit or wish to schedule another appointment please call our office during normal business hours [phone] (8-5:00 M-F)    Contact  Us:  Please call [phone] during business hours (8-5:00 M-F).  If after clinic hours, or on the weekend, please call  [phone].    Financial Assistance [phone]  [facility] Billing [phone]  [place] Billing Office, [facility]: [phone]  [place] Billing [phone]  Medical Records [phone]  [place] Patient Bill of Rights [email or web address]       MENTAL HEALTH CRISIS NUMBERS:  For a medical emergency please call  911 or go to the nearest ER.     [facility]:   [facility] -[phone]   Crisis Residence [facility] Residence -[phone]   Walk-In Counseling [facility] -[phone]   COPE 24/7 [place] Mobile Team -[phone] (adults)/[phone] (child)  [facility]: [facility] - [phone]      [facility]:   [facility] - [phone]   Walk-in counseling [facility] - [phone]   Walk-in counseling [facility] - [phone]   Crisis Residence [facility] Residence - [phone]  Urgent Care Adult Mental Cpkgpv-[phone] mobile unit/ 24/7 crisis line    National Crisis Numbers:   National Suicide Prevention Lifeline: 5-789-927-TALK (077-276-7393)  Poison Control Center - 7-417-838-1315  [employer] [place].Android App Review Source/resources for a list of additional resources ([place])  Trans Lifeline a hotline for transgender people 1-538.668.8501  The Kt Project a hotline for LGBT youth 1-951.506.3234  Crisis Text Line: For any crisis 24/7   To: 176382  see www.crisistextline.org  - IF MAKING A CALL FEELS TOO HARD, send a text!         Again thank you for choosing [facility] MENTAL HEALTH & ADDICTION [facility] [[email or web address]] and please let us know how we can best partner with you to improve you and your family's health.    You may be receiving a survey regarding this appointment. We would love to have your feedback, both positive and  negative. The survey is done by an external company, so your answers are anonymous.            - - -

## 2024-02-07 ENCOUNTER — VIRTUAL VISIT (OUTPATIENT)
Dept: PSYCHIATRY | Facility: CLINIC | Age: 19
End: 2024-02-07
Attending: PSYCHIATRY & NEUROLOGY
Payer: COMMERCIAL

## 2024-02-07 VITALS — HEIGHT: 63 IN | WEIGHT: 115 LBS | BODY MASS INDEX: 20.38 KG/M2

## 2024-02-07 DIAGNOSIS — F41.1 GAD (GENERALIZED ANXIETY DISORDER): Primary | ICD-10-CM

## 2024-02-07 DIAGNOSIS — F33.42 MAJOR DEPRESSIVE DISORDER, RECURRENT, IN FULL REMISSION (H): ICD-10-CM

## 2024-02-07 PROCEDURE — 99214 OFFICE O/P EST MOD 30 MIN: CPT | Mod: 95

## 2024-02-07 ASSESSMENT — PAIN SCALES - GENERAL: PAINLEVEL: NO PAIN (0)

## 2024-02-07 NOTE — PROGRESS NOTES
"   [facility]  TRANSFER of CARE DIAGNOSTIC ASSESSMENT     CARE TEAM:    PCP- Yen Jauregui  Therapist- None    Luz is a 19 year old who uses the pronouns she, her, hers.      Chief Concern     \"I'm comfortable with my meds currently and I'm doing well mentally\"      Diagnoses     Major depressive disorder, recurrent, in full remission with seasonal pattern    NILS      Assessment     Luz Webber is a 18 yo with past psychiatric diagnosis of depression and anxiety who had been seen at the [facility] clinic, most recently was working with Dr. Low and is here today to establish MH care with adult Psychiatry after being discharged from [facility] to PCP who seems was not comfortable managing psychotropic medications, eventually referred by Dr. Hanley from [facility].     Overall appears that Ernestinas depressive symptoms have been in remission over the past months and attributes this to her current medication regimen, which appears she is tolerating well without noticing any side effects.   She notices today that mood changes has been more noticeable during the winter season and we discussed recommendation of using her SAD lamp (has one at home) which she had not done this season.     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 It appears that hydroxyzine as needed has been helpful although causes some somnolence, recommended to decrease dose and agrees with recommendation. We discussed long-term use of hydroxyzine not being ideal (due to cumulative anticholinergic burden) and need may indicate underlying anxiety and that there may be room for optimizing antidepressant medication to better target anxiety.  We also discussed limiting caffeine intake and THC gummies use (see details below) as this " could contribute to heightened anxiety as well. She is open to continue this conversation in the future and politely declined resources at this time.     We discussed medication indications, risks vs benefits, side effects and alternatives including optimizing venlafaxine to better target anxiety and she expresses preference of not making medication changes today. She takes hydroxyzine only 1-2 times per week. Veronique and is open to continue conversation around medication changes in the near future as needed.     We discussed role of psychotherapy and this having best evidence in treatment of anxiety and depression in combination with medications and she expressed interest in engaging in psychotherapy (CBT to target anxiety and depression).   No other questions or concerns.       Future Considerations:  -Consider optimizing Venlafaxine to 225 if depressive symptoms were to re-emerge.   -Hydroxyzine has been helpful for anxiety and if this was to worsen, consider using propranolol, gabapentin or Buspar.  -Planning to find a psychiatrist for her in the community if she remains stable in the next visit.    Psychotropic Drug Interactions:  [PSYCHCLINICDDI]  ADDITIVE SEROTONERGIC: Sumatriptan, Venlafaxine  Management: routine monitoring and patient aware of risks     MNPMP was not checked today: not using controlled substances    Risk Statements:   Treatment Risk- Risks, benefits, alternatives and potential adverse effects have been discussed and are understood.   Safety Risk-Ulz did not appear to be an imminent safety risk to self or others.     Plan     1) Medications:   - Continue venlafaxine 150 mg daily  - Continue hydroxyzine to 25-50 mg PO BID PRN, anxiety    Other:   -OTC. Vitamin D supplementation (50 mcg daily) for Vitamin D insufficiency   -Sumatriptan 50 mg PO may repeat q 2 hrs, max of 4 tabs in 24 hrs. Prescribed by PCP.     2) Psychotherapy:   None currently, indicated individual therapy for CBT with  "focus on anxiety/mood    3) Next due:  Labs- Routine monitoring is not indicated for current psychotropic medication regimen   EKG- Routine monitoring is not indicated for current psychotropic medication regimen   Rating scales-  PHQ9, GAD7     4) Referrals:   Internal referral for CBT psychotherapy to target anxiety, mood. Intake pool alerted via Epic message.     5) Other:   -Recommended continue working with PCP for migraine management/ref to Neuro.   -Start using light box as discussed.    6) Follow-up: Return to BCT clinic in 3 months        Pertinent Background                                                   [most recent eval 01/09/24]     Luz reports that she has experienced anxiety her entire life, but symptoms noticeably worsened around middle school, at a time where she had some changes in friend groups.  She first sought therapy in October 2020, where she was diagnosed with depression and anxiety.  Both she previously thought were normally experienced by everyone.  In February of 2021, her PCP prescribed her sertraline for anxiety. Was not tolerated due to GI side effects, and was switched to fluoxetine 20 mg after 1 month.   In April and June 2021, fluoxetine was titrated up to 30 mg and 40 mg respectively as it was only partially effective for anxiety. Mom noticed significant emotional blunting, and pt was tending less to her needs (messy room, forgot to feed gecko and it passed away). Pt reported symptoms of a major depressive episode from that time. Prozac was decreased back to 30 mg, and symptoms improved although continued not providing adequate benefit for anxiety and depression. In 2021, was cross-titrated to venlafaxine and current dose of150 mg has worked best for her.   She was seen at the [facility] clinic since October 2021 to June 2023 and transferred to PCP/adult MH.     Pertinent items include: [N/A]     Subjective     \"I'm comfortable with my meds currently, I need someone to prescribe " "them\"   -Shares does not want to change current medication regimen as this has worked best    -Mood: \"Stable\"   -Winter season is hard in terms of not having enough recreational activities/time used to play hockey in the past, now work is primary stress.  -Happy with her job, but can be understandably stressful.  -[employer] has approximately 7 employees.   -Seasonal pattern to mood: \"I think past 2 years has been the case\" Not so much in the past as she had been more active during hockey season.   -Has a SAD lamp, has not used it since last winter, shares will start use   -Has been re-considering engaging in therapy in the past months   Internal, referral for psychotherapy     -Anxiety:  \"better now,\" \"definitely waves, most recently with the holidays was high\"   Gets tired, exhausting, sometimes gets headaches. Overall \"pushing through.\"   Experiences anxiety as: Mild SOB, Increased Increased HR, ~110-120 bpm, baseline HR is in the 70s range (apple watch). Notices is usually in the mid-afternoon or in the evening. A bit of hand tremors.  Hydroxyzine helps most of the time.  Panic attacks: Reports happened more in HS, shorter 10 min episodes of \"intense anxiety symptoms.\" After 10 min was more exhausted, some trailing anxiety. More after holding in my anxiety at the end of the day, never in public. Last time experience the above: Approximately in the fall. \"Not often anymore.\"     -Appetite: Ok, good.   -Energy: Generally low in the winter season  -Sleep: Usually wakes up 5:30 AM, goes to bed ~mid-night. Takes daily naps form 1-3 pm.   -Has back pain due to past sports injuries.   -Difficulty with initiating sleep sometimes.     -Tremor/abnormal movements: Denies   -Side effects? Chest pain, palpitations, sleep disturbances, Other side effects:   -Perceptual disturbances: Denies   -Safety: Denies SI/plan/intent, denies HI or other safety concerns      Recent Psych Symptoms:   Depression:  low energy, insomnia, and " "hypersomnia  Elevated:  none  Psychosis:  none  Anxiety:  excessive worry although this has been better after the holidays. Currently denies worriees out of the ordinary   Trauma Related:  None. Denies hx of trauma.  Other:  No    Current Social History:  Financial/occupational: Self-employed. Ownes a coffee shop, has 7 employees.  Living situation (partner, children, pets, etc):  Lives at parents home. Has an older (half-sister, 24 yo, finishing grad school in psychology, sex therapist) and a younger sister (15 yo). Lives with bio mom and dad and younger sisters.   Social/spiritual support: sisters, mom, some friends.   Feels safe at home: Yes    Pertinent Substance Use:   Alcohol: Yes: Casual glass of wine with family during the holidays.    Cannabis: Yes: 1 THC gummy (5 mg) once a month, given by her dad. Denies problematic use, cravings, noticing impact in mood or anxiety or other concerns currently. Continue to monitor closely/offer resources as needed.   Tobacco: No  Caffeine:  Yes: Drinks 4 cups a day. Latest has a cup is ~7-8 pm. Recommended to avoid after noon-2 pm    Opioids: No   Narcan Kit current: No  Other substances: none    Medical Review of Systems:   Lightheadedness/orthostasis: None  Headaches: Migraine headaches. Also reports runs in her family. Gets 3-5 episodes/month. Started after concussion in HS when playing hockey. LOC for ~5 seconds once. Never imaging or needing to go to the hospital. No seizure hx.  GI: none  Sexual health concerns: Denies being sexually activive     A comprehensive review of systems was performed and is negative other than noted above.    Contraception: None     Mental Status Exam     Alertness: alert  and oriented  Appearance: well groomed  Behavior/Demeanor: cooperative, pleasant, and calm, with good  eye contact   Speech: regular rate and rhythm  Language: intact and no problems  Psychomotor: normal or unremarkable  Mood: \"feeling well\"   Affect: full range; " "congruent to: mood- yes, content- yes  Thought Process/Associations: unremarkable  Thought Content:  Reports none;  Denies suicidal & violent ideation and delusions, delusions , obsessions , phobia , magical thinking, over-valued ideas, and paranoid ideation  Perception:  Reports none;  Denies auditory hallucinations, visual hallucinations, visual distortion seen as shadows , depersonalization, and derealization  Insight: good  Judgment: good  Cognition: does  appear grossly intact; formal cognitive testing was not done  Gait and Station: N/A (telehealth)     Social History                                 pt reported     Financial/employment: 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   Living situation/family: mother (Radha), father and younger sister. Gets along well with family members. Also has older half-sister who she is close to.   Children: No  Social/spiritual support: See above for current.  Education: Completed HS, Completed PSE[employer] at [employer]. Reports getting \"decent\" grades, in good academic standing. No history of 504 or IEP.   Early history:   Raised by: biological parents   Legal:  Denies      Family Mental Health History                                 pt reported     Mother: MDD, anxiety  Dad: Seasonal depression, anxiety.   Denies hx of bipolar disorder or psychotic disorders   Paternal  grandparent some \"heart issues.      Past Psychiatric History     Self injurious behavior [method, most recent]: No  Suicide attempt [#, most recent, method]: No  Suicidal ideation hx [passive, active]: No    Violence/Aggression Hx:No   Psychosis Hx: No   Eating Disorder Hx: No  Trauma hx: No    Psych Hosp [#, most recent]: No   Commitment: No   TMS/ECT: No   Outpatient Programs [Day treatment, DBT, eating disorder tx, etc]: No    SUBSTANCE USE HISTORY   Past Use: No  Treatment [#, most recent]: No   Medical Consequences: No   Legal Consequences: No      Past Psych Med Trials        " Medication Max Dose (mg) Dates / Duration Helpful? DC Reason / Adverse Effects?   Sertraline   For 1 month Feb 2020  N caused GI upset, discontinued    Fluoxetine   Mar - Oct 2020  Partially  caused nausea, only partially helpful for anxiety   Venlafaxine 150 mg  October 2021-present  Y       Vitals   There were no vitals taken for this visit.  Pulse Readings from Last 3 Encounters:   09/15/23 97   05/26/23 101   12/16/21 72     Wt Readings from Last 3 Encounters:   09/15/23 53.4 kg (117 lb 11.2 oz) (30%, Z= -0.53)*   05/26/23 51.7 kg (113 lb 14.4 oz) (23%, Z= -0.73)*   12/16/21 50.8 kg (112 lb) (25%, Z= -0.67)*     * Growth percentiles are based on [facility] (Girls, 2-20 Years) data.     BP Readings from Last 3 Encounters:   09/15/23 118/77   05/26/23 137/88   12/16/21 120/82 (83%, Z = 0.95 /  96%, Z = 1.75)*     *BP percentiles are based on the 2017 AAP Clinical Practice Guideline for girls        Medical History     ALLERGIES: Patient has no known allergies.    Patient Active Problem List   Diagnosis    Acne vulgaris    Generalized anxiety disorder    Major depressive disorder, recurrent, moderate (H)    Encounter for preventive care    Spondylolysis    Migraine with aura and with status migrainosus, not intractable    Medication monitoring encounter    Kyphosis (acquired) (postural)        Medications     Current Outpatient Medications   Medication Sig Dispense Refill    adapalene (DIFFERIN) 0.1 % external cream [ADAPALENE (DIFFERIN) 0.1 % CREAM] Apply to affected area nightly 45 g 1    cholecalciferol, vitamin D3, (VITAMIN D3 ORAL) [CHOLECALCIFEROL, VITAMIN D3, (VITAMIN D3 ORAL)] Take by mouth.      hydrOXYzine luis manuel (VISTARIL) 25 MG capsule Take 1-2 capsules (25-50 mg) by mouth 2 times daily as needed for anxiety 120 capsule 3    SUMAtriptan (IMITREX) 50 MG tablet Take 1 tablet (50 mg) by mouth at onset of headache for migraine May repeat in 2 hours. Max 4 tablets/24 hours. 12 tablet 3    venlafaxine (EFFEXOR XR) 150  "MG 24 hr capsule Take 1 capsule (150 mg) by mouth daily 30 capsule 3        Labs and Data         1/10/2024     8:02 AM 1/10/2024     8:15 AM 1/23/2024     6:42 AM   PROMIS-10 Total Score w/o Sub Scores   PROMIS TOTAL - SUBSCORES 25 25 21         1/10/2024     8:02 AM 1/10/2024     8:15 AM   CAGE-AID Total Score   Total Score 0 0   Total Score MyChart  0 (A total score of 2 or greater is considered clinically significant)         1/10/2024     7:47 AM 1/10/2024     8:14 AM 1/23/2024     6:41 AM   PHQ-9 SCORE   PHQ-9 Total Score MyChart  9 (Mild depression) 10 (Moderate depression)   PHQ-9 Total Score 9 9 10         5/26/2023     3:05 PM 1/10/2024     8:00 AM 1/10/2024     8:15 AM   NILS-7 SCORE   Total Score 10 (moderate anxiety)  9 (mild anxiety)   Total Score 10 9 9       Liver/Kidney Function, TSH Metabolic Blood counts   Recent Labs   Lab Test 09/15/23  1405 06/01/23  1051   AST 24 20   ALT 16 16   ALKPHOS 54 61   CR 0.70 0.78     Recent Labs   Lab Test 03/19/21  1530   TSH 1.31    Recent Labs   Lab Test 06/01/23  1051   CHOL 179*   TRIG 64   LDL 97   HDL 69     No lab results found.  Recent Labs   Lab Test 09/15/23  1405   GLC 89    Recent Labs   Lab Test 09/15/23  1405   WBC 5.2   HGB 13.7   HCT 39.0   MCV 82             October/2021 ECG  QTc = 463 ms, followed by 12-day Holer study for \"irregular heartbeat\" with normal results, per records.      PROVIDER: Sheila Boucher MD    Level of Medical Decision Making:   - At least 1 chronic problem that is not stable  - Engaged in prescription drug management during visit (discussed any medication benefits, side effects, alternatives, etc.)    Patient discussed in clinic with Dr. Cali who will sign the note and the BCT team.  Supervisor is Dr. García.      "

## 2024-02-07 NOTE — PROGRESS NOTES
Virtual Visit Details    Type of service:  Video Visit     Originating Location (pt. Location): Home    Distant Location (provider location):  On-site  Platform used for Video Visit: Edita

## 2024-02-07 NOTE — PATIENT INSTRUCTIONS
**For crisis resources, please see the information at the end of this document**   Patient Education    Thank you for coming to the [facility] [place] [facility].     Lab Testing:  If you had lab testing today and your results are reassuring or normal they will be mailed to you or sent through Elance within 7 days. If the lab tests need quick action we will call you with the results. The phone number we will call with results is # [phone]. If this is not the best number please call our clinic and change the number.     Medication Refills:  If you need any refills please call your pharmacy and they will contact us. Our fax number for refills is [phone].   Three business days of notice are needed for general medication refill requests.   Five business days of notice are needed for controlled substance refill requests.   If you need to change to a different pharmacy, please contact the new pharmacy directly. The new pharmacy will help you get your medications transferred.     Contact Us:  Please call [phone] during business hours (8-5:00 M-F).   If you have medication related questions after clinic hours, or on the weekend, please call [phone].     Financial Assistance [phone]   Medical Records [phone]       MENTAL HEALTH CRISIS RESOURCES:  For a emergency help, please call 911 or go to the nearest Emergency Department.     Emergency Walk-In Options:   EmPATH Unit @ [facility] ([place]): [phone] - Specialized mental health emergency area designed to be calming  [facility] ([place]): [phone]  [facility] ([place]): [phone]  [facility]): [phone]    [facility] Crisis Information:   [place]: [phone]  [place]: [phone]  [place] (RICHARD) - Adult: [phone]     Child: [phone]  [place] - Adult: [phone]     Child: [phone]  [place]:  [phone]  List of all [facility] resources:   https://[place].gov/dhs/people-we-serve/adults/health-care/mental-health/resources/crisis-contacts.jsp    National Crisis Information:   Crisis Text Line: Text  [place]  to 813789  Suicide & Crisis Lifeline: 988  National Suicide Prevention Lifeline: 8-272-028-TALK (1-880.483.4002)       For online chat options, visit https://suicidepreventionlifeline.org/chat/  Poison Control Center: 3-907-029-0441  Trans Lifeline: 5-801-351-9576 - Hotline for transgender people of all ages  The Kt Project: 0-999-604-7640 - Hotline for LGBT youth     For Non-Emergency Support:   Fast Tracker: Mental Health & Substance Use Disorder Resources -   https://www.[employer].org/

## 2024-02-07 NOTE — NURSING NOTE
Is the patient currently in the state of [place]? YES    Visit mode:VIDEO    If the visit is dropped, the patient can be reconnected by: VIDEO VISIT: Text to cell phone:   Telephone Information:   Mobile [phone]       Will anyone else be joining the visit? NO  (If patient encounters technical issues they should call [phone])    How would you like to obtain your AVS? MyChart    Are changes needed to the allergy or medication list? No    Reason for visit: RECHECK    Juan Manuel GARSIA

## 2024-02-13 ENCOUNTER — OFFICE VISIT (OUTPATIENT)
Dept: FAMILY MEDICINE | Facility: CLINIC | Age: 19
End: 2024-02-13
Payer: COMMERCIAL

## 2024-02-13 VITALS
OXYGEN SATURATION: 98 % | HEIGHT: 63 IN | HEART RATE: 83 BPM | SYSTOLIC BLOOD PRESSURE: 128 MMHG | WEIGHT: 118.44 LBS | TEMPERATURE: 98.9 F | RESPIRATION RATE: 16 BRPM | BODY MASS INDEX: 20.98 KG/M2 | DIASTOLIC BLOOD PRESSURE: 86 MMHG

## 2024-02-13 DIAGNOSIS — G89.29 CHRONIC RIGHT SHOULDER PAIN: ICD-10-CM

## 2024-02-13 DIAGNOSIS — F33.1 MAJOR DEPRESSIVE DISORDER, RECURRENT, MODERATE (H): ICD-10-CM

## 2024-02-13 DIAGNOSIS — Z01.818 PREOP GENERAL PHYSICAL EXAM: Primary | ICD-10-CM

## 2024-02-13 DIAGNOSIS — G43.101 MIGRAINE WITH AURA AND WITH STATUS MIGRAINOSUS, NOT INTRACTABLE: ICD-10-CM

## 2024-02-13 DIAGNOSIS — M25.511 CHRONIC RIGHT SHOULDER PAIN: ICD-10-CM

## 2024-02-13 LAB
ERYTHROCYTE [DISTWIDTH] IN BLOOD BY AUTOMATED COUNT: 12.2 % (ref 10–15)
HCT VFR BLD AUTO: 42.7 % (ref 35–47)
HGB BLD-MCNC: 14.5 G/DL (ref 11.7–15.7)
HOLD SPECIMEN: NORMAL
MCH RBC QN AUTO: 29.1 PG (ref 26.5–33)
MCHC RBC AUTO-ENTMCNC: 34 G/DL (ref 31.5–36.5)
MCV RBC AUTO: 86 FL (ref 78–100)
PLATELET # BLD AUTO: 241 10E3/UL (ref 150–450)
RBC # BLD AUTO: 4.99 10E6/UL (ref 3.8–5.2)
WBC # BLD AUTO: 5.7 10E3/UL (ref 4–11)

## 2024-02-13 PROCEDURE — 36415 COLL VENOUS BLD VENIPUNCTURE: CPT

## 2024-02-13 PROCEDURE — 99214 OFFICE O/P EST MOD 30 MIN: CPT

## 2024-02-13 PROCEDURE — 85027 COMPLETE CBC AUTOMATED: CPT

## 2024-02-13 NOTE — ASSESSMENT & PLAN NOTE
Indication for planned procedure.  The patient had an injury dating back to [place] 19, with poor healing and.  She continues to struggle with both limited mobility and pain, therefore pursuing surgical intervention.  Her specialty notes are unavailable to me at the time of her visit, but patient is an excellent historian.

## 2024-02-13 NOTE — ASSESSMENT & PLAN NOTE
Patient is cleared for planned procedure as documented elsewhere.  Today, we discussed basic n.p.o. recommendations as well as antiseptic bathing.  She is aware that specific preoperative instruction and all postoperative guidance including activity restrictions and pain control will come from her surgical team.  We reviewed medications today.  All questions were answered at the conclusion of her visit.

## 2024-02-13 NOTE — PROGRESS NOTES
Preoperative Evaluation  [facility]  [address]  [facility] [address]  Phone: [phone]  Fax: [phone]  Primary Provider: Yen Jauregui  Pre-op Performing Provider: MERRY LIM  Feb 13, 2024       Veronique is a 19 year old, presenting for the following:  Pre-Op Exam (RT shoulder mini-open distal clavicle excision on 02/15/24 with Dr. Rivera at [facility]  [id number])        2/13/2024    11:00 AM   Additional Questions   Roomed by sac   Accompanied by self         2/13/2024    11:00 AM   Patient Reported Additional Medications   Patient reports taking the following new medications no     Surgical Information  Surgery/Procedure: RT shoulder mini-open distal clavicle excision  Surgery Location: [facility]  Surgeon: Dr. Rivera  Surgery Date: 02/15/24  Time of Surgery: TBD  Where patient plans to recover: At home with family  Fax number for surgical facility: [phone]    Assessment & Plan     The proposed surgical procedure is considered INTERMEDIATE risk.    Problem List Items Addressed This Visit       Major depressive disorder, recurrent, moderate (H)     Patient sees psychiatry secondary to her mood.  She is currently maintained on venlafaxine 150 mg XR daily as well as as needed hydroxyzine.  She reports that her mood is stable and adequately controlled.  She has planned follow-up with psychiatry in the coming months to or managed by psychiatry.  We discussed that no changes are indicated regarding these medications prior to her upcoming procedure.         Migraine with aura and with status migrainosus, not intractable     Uses sumatriptan sparingly. Reports on average, she is getting migraines on average 4 or 5 times a month. She reports that recently, she was told by her psychiatrist that this should not be used simultaneously with Effexor. Discussed that I would believe her risk for serotonin syndrome to be relatively  low, however encouraged her to follow up with her PCP regarding other options for headache  or prevention. She is aware that sumatriptan should be held 24 hours prior to anesthesia.         Chronic right shoulder pain     Indication for planned procedure.  The patient had an injury dating back to [place] 19, with poor healing and.  She continues to struggle with both limited mobility and pain, therefore pursuing surgical intervention.  Her specialty notes are unavailable to me at the time of her visit, but patient is an excellent historian.         Preop general physical exam - Primary     Patient is cleared for planned procedure as documented elsewhere.  Today, we discussed basic n.p.o. recommendations as well as antiseptic bathing.  She is aware that specific preoperative instruction and all postoperative guidance including activity restrictions and pain control will come from her surgical team.  We reviewed medications today.  All questions were answered at the conclusion of her visit.         Relevant Orders    CBC with Platelets and Reflex to Iron Studies      - No identified additional risk factors other than previously addressed    Antiplatelet or Anticoagulation Medication Instructions   - Patient is on no antiplatelet or anticoagulation medications.    Additional Medication Instructions  Patient is to take all scheduled medications on the day of surgery EXCEPT for modifications listed below:   - ibuprofen (Advil, Motrin): HOLD 1 day before surgery.    - SSRIs, SNRIs, TCAs, Antipsychotics: Continue without modification.    - Herbal medications and vitamins: HOLD 14 days prior to surgery.    Recommendation  APPROVAL GIVEN to proceed with proposed procedure, without further diagnostic evaluation.    Subjective     HPI related to upcoming procedure: Patient reports an injury to the right shoulder during a hockey game at the age of 15. She reports that she had malunion/poor healing of the clavicle on the  right side, therefore she has had persistent discomfort and is now pursuing surgical intervention.        2/13/2024    10:50 AM   Preop Questions   1. Have you ever had a heart attack or stroke? No   2. Have you ever had surgery on your heart or blood vessels, such as a stent placement, a coronary artery bypass, or surgery on an artery in your head, neck, heart, or legs? No   3. Do you have chest pain with activity? No   4. Do you have a history of  heart failure? No   5. Do you currently have a cold, bronchitis or symptoms of other infection? No   6. Do you have a cough, shortness of breath, or wheezing? No   7. Do you or anyone in your family have previous history of blood clots? No   8. Do you or does anyone in your family have a serious bleeding problem such as prolonged bleeding following surgeries or cuts? No   9. Have you ever had problems with anemia or been told to take iron pills? No   10. Have you had any abnormal blood loss such as black, tarry or bloody stools, or abnormal vaginal bleeding? No   11. Have you ever had a blood transfusion? No   12. Are you willing to have a blood transfusion if it is medically needed before, during, or after your surgery? Yes   13. Have you or any of your relatives ever had problems with anesthesia? No   14. Do you have sleep apnea, excessive snoring or daytime drowsiness? No   15. Do you have any artifical heart valves or other implanted medical devices like a pacemaker, defibrillator, or continuous glucose monitor? No   16. Do you have artificial joints? No   17. Are you allergic to latex? No   18. Is there any chance that you may be pregnant? No     Health Care Directive  Patient does not have a Health Care Directive or Living Will: Discussed advance care planning with patient; however, patient declined at this time.    Preoperative Review of    reviewed - no record of controlled substances prescribed.      Patient Active Problem List    Diagnosis Date Noted     Chronic right shoulder pain 02/13/2024     Priority: Medium    Preop general physical exam 02/13/2024     Priority: Medium    Medication monitoring encounter 09/15/2023     Priority: Medium    Kyphosis (acquired) (postural) 09/15/2023     Priority: Medium    Migraine with aura and with status migrainosus, not intractable 05/26/2023     Priority: Medium    Acne vulgaris 03/25/2021     Priority: Medium     Last Assessment & Plan:   Formatting of this note might be different from the original.  Continue cetaphil cleanser  Add differin pea sided amount to face with generous moisturizer, use at night expect redness and peeling for first week.   Follow up in 1 month.      Generalized anxiety disorder 03/25/2021     Priority: Medium    Major depressive disorder, recurrent, moderate (H) 01/13/2021     Priority: Medium    Encounter for preventive care 01/13/2021     Priority: Medium     Last Assessment & Plan:   Formatting of this note might be different from the original.  hgb today to makes sure menstrual blood loss is not excessive.      Spondylolysis 12/28/2016     Priority: Medium     Formatting of this note might be different from the original.  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 Abscess of right inner labia majora - seems now to be spreading to contralateral side, recommend trial of bactrim, rtc if worsens or not improved in 48 hours. She is also having some dysuria, so I suggested we get ua, but she declined.   I did ask both the patient and her mother separately whether they thought I    Nephrolithiasis     Age 3    Osgood-Schlatter's  "disease of left lower extremity 6/13/2016     Replacement Utility updated for latest IMO load    Sacroiliac joint pain 1/12/2021     Past Surgical History:   Procedure Laterality Date    CYSTOSCOPY,URETEROSCOPY,STONE REMV  2008    70% Ca Phos, 10% Ca Ox mono, 20% Ca Ox di    DAVINCI PYELOPLASTY  4/2009     CYSTOSCOPY,MANIPULATN      Description: Cystoscopy With Manipulation Of Ureteral Calculus Right;  Recorded: 04/01/2009;  Comments: age 3    OTHER SURGICAL HISTORY      kidneyUPJ obstruction released age 4     Current Outpatient Medications   Medication Sig Dispense Refill    adapalene (DIFFERIN) 0.1 % external cream [ADAPALENE (DIFFERIN) 0.1 % CREAM] Apply to affected area nightly 45 g 1    cholecalciferol, vitamin D3, (VITAMIN D3 ORAL) [CHOLECALCIFEROL, VITAMIN D3, (VITAMIN D3 ORAL)] Take by mouth.      hydrOXYzine luis manuel (VISTARIL) 25 MG capsule Take 1-2 capsules (25-50 mg) by mouth 2 times daily as needed for anxiety 120 capsule 3    SUMAtriptan (IMITREX) 50 MG tablet Take 1 tablet (50 mg) by mouth at onset of headache for migraine May repeat in 2 hours. Max 4 tablets/24 hours. 12 tablet 3    venlafaxine (EFFEXOR XR) 150 MG 24 hr capsule Take 1 capsule (150 mg) by mouth daily 30 capsule 3       No Known Allergies     Social History     Tobacco Use    Smoking status: Never    Smokeless tobacco: Never   Substance Use Topics    Alcohol use: No     History   Drug Use Unknown         Review of Systems    Review of Systems  Constitutional, HEENT, cardiovascular, pulmonary, gi and gu systems are negative, except as otherwise noted.    Objective    /86 (BP Location: Left arm, Patient Position: Sitting, Cuff Size: Adult Regular)   Pulse 83   Temp 98.9  F (37.2  C) (Oral)   Resp 16   Ht 1.6 m (5' 3\")   Wt 53.7 kg (118 lb 7 oz)   LMP 02/08/2024 (Approximate)   SpO2 98%   BMI 20.98 kg/m     Estimated body mass index is 20.98 kg/m  as calculated from the following:    Height as of this encounter: 1.6 m (5' " "3\").    Weight as of this encounter: 53.7 kg (118 lb 7 oz).    Physical Exam  GENERAL: alert and no distress  EYES: Eyes grossly normal to inspection, PERRL and conjunctivae and sclerae normal  HENT: ear canals and TM's normal, nose and mouth without ulcers or lesions  NECK: no adenopathy, no asymmetry, masses, or scars  RESP: lungs clear to auscultation - no rales, rhonchi or wheezes  CV: regular rate and rhythm, normal S1 S2, no S3 or S4, no murmur, click or rub, no peripheral edema  ABDOMEN: soft, nontender, no hepatosplenomegaly, no masses and bowel sounds normal  MS: no gross musculoskeletal defects noted, no edema  SKIN: no suspicious lesions or rashes  NEURO: Normal strength and tone, mentation intact and speech normal  PSYCH: mentation appears normal, affect normal/bright    Recent Labs   Lab Test 09/15/23  1405 06/01/23  1051   HGB 13.7 11.6*    300    144   POTASSIUM 3.8 3.7   CR 0.70 0.78        Diagnostics  Recent Results (from the past 24 hour(s))   CBC with Platelets and Reflex to Iron Studies    Collection Time: 02/13/24 11:34 AM   Result Value Ref Range    WBC Count 5.7 4.0 - 11.0 10e3/uL    RBC Count 4.99 3.80 - 5.20 10e6/uL    Hemoglobin 14.5 11.7 - 15.7 g/dL    Hematocrit 42.7 35.0 - 47.0 %    MCV 86 78 - 100 fL    MCH 29.1 26.5 - 33.0 pg    MCHC 34.0 31.5 - 36.5 g/dL    RDW 12.2 10.0 - 15.0 %    Platelet Count 241 150 - 450 10e3/uL      No EKG required, no history of coronary heart disease, significant arrhythmia, peripheral arterial disease or other structural heart disease.    Revised Cardiac Risk Index (RCRI)  The patient has the following serious cardiovascular risks for perioperative complications:   - No serious cardiac risks = 0 points     RCRI Interpretation: 0 points: Class I (very low risk - 0.4% complication rate)       Signed Electronically by: VIRGINIA Gates CNP  Copy of this evaluation report is provided to requesting physician.    "

## 2024-02-13 NOTE — ASSESSMENT & PLAN NOTE
Rich presents today for her OB visit.    Patient would like communication of their results via:   Helio    Patient's current [facility] status: Active.     Chaperone needed:  No    [facility] [employer] - Patient is on [employer] Scheduling.           Uses sumatriptan sparingly. Reports on average, she is getting migraines on average 4 or 5 times a month. She reports that recently, she was told by her psychiatrist that this should not be used simultaneously with Effexor. Discussed that I would believe her risk for serotonin syndrome to be relatively low, however encouraged her to follow up with her PCP regarding other options for headache  or prevention. She is aware that sumatriptan should be held 24 hours prior to anesthesia.

## 2024-02-13 NOTE — PATIENT INSTRUCTIONS
Preparing for Your Surgery  Getting started  A nurse will call you to review your health history and instructions. They will give you an arrival time based on your scheduled surgery time. Please be ready to share:  Your doctor's clinic name and phone number  Your medical, surgical, and anesthesia history  A list of allergies and sensitivities  A list of medicines, including herbal treatments and over-the-counter drugs  Whether the patient has a legal guardian (ask how to send us the papers in advance)  Please tell us if you're pregnant--or if there's any chance you might be pregnant. Some surgeries may injure a fetus (unborn baby), so they require a pregnancy test. Surgeries that are safe for a fetus don't always need a test, and you can choose whether to have one.   If you have a child who's having surgery, please ask for a copy of Preparing for Your Child's Surgery.    Preparing for surgery  Within 10 to 30 days of surgery: Have a pre-op exam (sometimes called an H&P, or History and Physical). This can be done at a clinic or pre-operative center.  If you're having a , you may not need this exam. Talk to your care team.  At your pre-op exam, talk to your care team about all medicines you take. If you need to stop any medicines before surgery, ask when to start taking them again.  We do this for your safety. Many medicines can make you bleed too much during surgery. Some change how well surgery (anesthesia) drugs work.  Call your insurance company to let them know you're having surgery. (If you don't have insurance, call [phone].)  Call your clinic if there's any change in your health. This includes signs of a cold or flu (sore throat, runny nose, cough, rash, fever). It also includes a scrape or scratch near the surgery site.  If you have questions on the day of surgery, call your hospital or surgery center.  Eating and drinking guidelines  For your safety: Unless your surgeon tells you otherwise,  follow the guidelines below.  Eat and drink as usual until 8 hours before you arrive for surgery. After that, no food or milk.  Drink clear liquids until 2 hours before you arrive. These are liquids you can see through, like water, Gatorade, and Propel Water. They also include plain black coffee and tea (no cream or milk), candy, and breath mints. You can spit out gum when you arrive.  If you drink alcohol: Stop drinking it the night before surgery.  If your care team tells you to take medicine on the morning of surgery, it's okay to take it with a sip of water.  Preventing infection  Shower or bathe the night before and morning of your surgery. Follow the instructions your clinic gave you. (If no instructions, use regular soap.)  Don't shave or clip hair near your surgery site. We'll remove the hair if needed.  Don't smoke or vape the morning of surgery. You may chew nicotine gum up to 2 hours before surgery. A nicotine patch is okay.  Note: Some surgeries require you to completely quit smoking and nicotine. Check with your surgeon.  Your care team will make every effort to keep you safe from infection. We will:  Clean our hands often with soap and water (or an alcohol-based hand rub).  Clean the skin at your surgery site with a special soap that kills germs.  Give you a special gown to keep you warm. (Cold raises the risk of infection.)  Wear special hair covers, masks, gowns and gloves during surgery.  Give antibiotic medicine, if prescribed. Not all surgeries need antibiotics.  What to bring on the day of surgery  Photo ID and insurance card  Copy of your health care directive, if you have one  Glasses and hearing aids (bring cases)  You can't wear contacts during surgery  Inhaler and eye drops, if you use them (tell us about these when you arrive)  CPAP machine or breathing device, if you use them  A few personal items, if spending the night  If you have . . .  A pacemaker, ICD (cardiac defibrillator) or other  implant: Bring the ID card.  An implanted stimulator: Bring the remote control.  A legal guardian: Bring a copy of the certified (court-stamped) guardianship papers.  Please remove any jewelry, including body piercings. Leave jewelry and other valuables at home.  If you're going home the day of surgery  You must have a responsible adult drive you home. They should stay with you overnight as well.  If you don't have someone to stay with you, and you aren't safe to go home alone, we may keep you overnight. Insurance often won't pay for this.  After surgery  If it's hard to control your pain or you need more pain medicine, please call your surgeon's office.  Questions?   If you have any questions for your care team, list them here: _________________________________________________________________________________________________________________________________________________________________________ ____________________________________ ____________________________________ ____________________________________  For informational purposes only. Not to replace the advice of your health care provider. Copyright   2003, 2019 [facility]. All rights reserved. Clinically reviewed by Valeria Peralta MD. [employer] 317692 - REV 12/22.    How to Take Your Medication Before Surgery  - Take all of your medications before surgery except as noted below  - STOP taking all vitamins and herbal supplements 14 days before surgery.  - No Imitrex or ibuprofen for 24 hours

## 2024-02-13 NOTE — ASSESSMENT & PLAN NOTE
Patient sees psychiatry secondary to her mood.  She is currently maintained on venlafaxine 150 mg XR daily as well as as needed hydroxyzine.  She reports that her mood is stable and adequately controlled.  She has planned follow-up with psychiatry in the coming months to or managed by psychiatry.  We discussed that no changes are indicated regarding these medications prior to her upcoming procedure.

## 2024-05-22 DIAGNOSIS — F41.1 GENERALIZED ANXIETY DISORDER: ICD-10-CM

## 2024-05-22 RX ORDER — VENLAFAXINE HYDROCHLORIDE 150 MG/1
150 CAPSULE, EXTENDED RELEASE ORAL DAILY
Qty: 30 CAPSULE | Refills: 3 | Status: SHIPPED | OUTPATIENT
Start: 2024-05-22 | End: 2024-09-16

## 2024-05-22 NOTE — TELEPHONE ENCOUNTER
Last seen: 02/07/2024  RTC: 3 months  Cancel: 0  No-show: 0  Next appt: None yet, waiting for new schedules     Incoming refill from Patient via phone    Medication requested:   Pending Prescriptions:                       Disp   Refills    venlafaxine (EFFEXOR XR) 150 MG 24 hr cap*30 cap*3            Sig: Take 1 capsule (150 mg) by mouth daily      From chart note:   - Continue venlafaxine 150 mg daily      Medication unable to be refilled by RN due to criteria not met as indicated.                 []Eligibility - not seen in the last year              [x]Supervision - no future appointment              []Compliance - no shows, cancellations or lapse in therapy              []Verification - order discrepancy              []Controlled medication              []Medication not included in policy              []90-day supply request              []Other:

## 2024-05-22 NOTE — TELEPHONE ENCOUNTER
"[facility]    Phone Message    May a detailed message be left on voicemail: yes     Reason for Call: Medication Refill Request    Has the patient contacted the pharmacy for the refill? Yes   Name of medication being requested: Effexor 150mg  Provider who prescribed the medication: Dr. Boucher  Pharmacy: [id number] [address] AT [facility] [address]   Date medication is needed: Caller stated she will be out of medication by [facility] and was quite teary and stated \"The pharmacy said they faxed over an order several days ago and I need the medication\". Caller stated this issue keeps happening where she has to contact the clinic to get the medication. Caller asked if RN could call her as soon as possible.    Patient is to schedule a follow-up appointment in July with new provider. Schedules are not out yet to schedule at this time.    Action Taken: Message routed to:  Other: [facility] Nurse pool    Travel Screening: Not Applicable                                                                   "

## 2024-05-22 NOTE — TELEPHONE ENCOUNTER
RN writer called patient to let her know that her medication refill request has been received and will be signed and sent to her pharmacy by the end of the day.

## 2024-09-01 ENCOUNTER — HEALTH MAINTENANCE LETTER (OUTPATIENT)
Age: 19
End: 2024-09-01

## 2024-09-16 DIAGNOSIS — F41.1 GENERALIZED ANXIETY DISORDER: ICD-10-CM

## 2024-09-16 NOTE — TELEPHONE ENCOUNTER
Writer attempted to call patient to check in as she has not been seen since February. LVM requesting a call back at the main clinic number.

## 2024-09-16 NOTE — TELEPHONE ENCOUNTER
From chart note:   - Continue venlafaxine 150 mg daily     Medication unable to be refilled by RN due to criteria not met as indicated.                 []Eligibility - not seen in the last year              []Supervision - no future appointment              []Compliance - no shows, cancellations or lapse in therapy              []Verification - order discrepancy              []Controlled medication              []Medication not included in policy              []90-day supply request              [x]Other:

## 2024-09-16 NOTE — TELEPHONE ENCOUNTER
[facility] Center    Phone Message    May a detailed message be left on voicemail: yes     Reason for Call: Medication Refill Request    Has the patient contacted the pharmacy for the refill? Yes   Name of medication being requested: effexor  Provider who prescribed the medication:   Pharmacy: [facility]  Date medication is needed: Might have one more    Action Taken: Other: [facility] pool    Travel Screening: Not Applicable     Date of Service:

## 2024-09-16 NOTE — TELEPHONE ENCOUNTER
Last seen: 2/7  RTC: 3 months  Cancel: None  No-show: None  Next appt: 10/1       Medication requested:   venlafaxine (EFFEXOR XR) 150 MG 24 hr capsule       From chart note:    1) Medications:   - Continue venlafaxine 150 mg daily  - Continue hydroxyzine to 25-50 mg PO BID PRN, anxiety      Refills sent to RN for final review

## 2024-09-17 RX ORDER — VENLAFAXINE HYDROCHLORIDE 150 MG/1
150 CAPSULE, EXTENDED RELEASE ORAL DAILY
Qty: 30 CAPSULE | Refills: 0 | Status: SHIPPED | OUTPATIENT
Start: 2024-09-17

## 2024-09-17 NOTE — TELEPHONE ENCOUNTER
Reached out to patient and confirmed she is still taking Effexor as prescribed. Shared she lost track of time and forgot to schedule as she had refills for her medications. Patient is wiling to be seen at any time and does not have any concerns with being seen in our clinic every 3 months. Reminded her of the appt on 10/10. Will route the refill to provider for approval.

## 2024-10-10 ENCOUNTER — VIRTUAL VISIT (OUTPATIENT)
Dept: PSYCHIATRY | Facility: CLINIC | Age: 19
End: 2024-10-10
Attending: PSYCHIATRY & NEUROLOGY
Payer: COMMERCIAL

## 2024-10-10 VITALS — HEIGHT: 63 IN | WEIGHT: 115 LBS | BODY MASS INDEX: 20.38 KG/M2

## 2024-10-10 DIAGNOSIS — F41.1 GENERALIZED ANXIETY DISORDER: ICD-10-CM

## 2024-10-10 PROCEDURE — 99215 OFFICE O/P EST HI 40 MIN: CPT | Mod: 95 | Performed by: PSYCHIATRY & NEUROLOGY

## 2024-10-10 PROCEDURE — 99417 PROLNG OP E/M EACH 15 MIN: CPT | Mod: 95 | Performed by: PSYCHIATRY & NEUROLOGY

## 2024-10-10 RX ORDER — VENLAFAXINE HYDROCHLORIDE 150 MG/1
150 CAPSULE, EXTENDED RELEASE ORAL DAILY
Qty: 30 CAPSULE | Refills: 1 | Status: SHIPPED | OUTPATIENT
Start: 2024-10-10 | End: 2024-11-07

## 2024-10-10 RX ORDER — VENLAFAXINE HYDROCHLORIDE 37.5 MG/1
CAPSULE, EXTENDED RELEASE ORAL
Qty: 30 CAPSULE | Refills: 1 | Status: SHIPPED | OUTPATIENT
Start: 2024-10-10

## 2024-10-10 ASSESSMENT — PAIN SCALES - GENERAL: PAINLEVEL: MILD PAIN (2)

## 2024-10-10 ASSESSMENT — PATIENT HEALTH QUESTIONNAIRE - PHQ9
SUM OF ALL RESPONSES TO PHQ QUESTIONS 1-9: 7
SUM OF ALL RESPONSES TO PHQ QUESTIONS 1-9: 7

## 2024-10-10 NOTE — NURSING NOTE
Current patient location: [address] [place] [id number]    Is the patient currently in the state of [place]? YES    Visit mode:VIDEO    If the visit is dropped, the patient can be reconnected by: VIDEO VISIT: Send to e-mail at: [email or web address]    Will anyone else be joining the visit? NO  (If patient encounters technical issues they should call [phone])    How would you like to obtain your AVS? MyChart    Are changes needed to the allergy or medication list? Pt stated no changes to allergies and Pt stated no med changes    Are refills needed on medications prescribed by this physician? YES    Rooming Documentation:  Questionnaire(s) completed    Reason for visit: RECHECK     Tiff GARSIA

## 2024-10-10 NOTE — PATIENT INSTRUCTIONS
Nice to meet you today Veronique!    Here is a summary of what we talked about:  -Increase Venlafaxine by 37.5 mg cap (to be taken together with 150 mg capsule)  -Optimize sleep by setting an alarm to decrease nap to 1 or 1.5 hr.  Use Melatonin 1-2 mg at bedtime to help with sleep initiation  -Limit caffeine in the afternoon.  Avoid evening use    Let us know if you have any questions or concerns after the appointment.  I will plan on seeing you again in 4 weeks.     John Barragan MD    **For crisis resources, please see the information at the end of this document**   Patient Education    Thank you for coming to the [facility] [place] [facility].     Lab Testing:  If you had lab testing today and your results are reassuring or normal they will be mailed to you or sent through [employer] within 7 days. If the lab tests need quick action we will call you with the results. The phone number we will call with results is # [phone]. If this is not the best number please call our clinic and change the number.     Medication Refills:  If you need any refills please call your pharmacy and they will contact us. Our fax number for refills is [phone].   Three business days of notice are needed for general medication refill requests.   Five business days of notice are needed for controlled substance refill requests.   If you need to change to a different pharmacy, please contact the new pharmacy directly. The new pharmacy will help you get your medications transferred.     Contact Us:  Please call [phone] during business hours (8-5:00 M-F).   If you have medication related questions after clinic hours, or on the weekend, please call [phone].     Financial Assistance [phone]   Medical Records [phone]       MENTAL HEALTH CRISIS RESOURCES:  For a emergency help, please call 911 or go to the nearest Emergency Department.     Emergency Walk-In Options:   EmPATH Unit @  [facility] ([place]): [phone] - Specialized mental health emergency area designed to be calming  [facility] ([place]): [phone]  [facility] ([place]): [phone]  [facility] ([place]): [phone]    [facility] Crisis Information:   [place]: [phone]  [place]: [phone]  [place] ([place]) - Adult: [phone]     Child: [phone]  [place] - Adult: [phone]     Child: [phone]  [place]: [phone]  List of all [facility] resources:   [email or web address]/dhs/people-[email or web address]-serve/adults/health-care/mental-health/resources/crisis-contacts.jsp    National Crisis Information:   Crisis Text Line: Text  [place]  to 257262  Suicide & Crisis Lifeline: 988  National Suicide Prevention Lifeline: 8-705-593-TALK (1-335.332.2874)       For online chat options, visit https://suicidepreventionlifeline.org/chat/  Poison Control Center: 1-880.921.5112  Trans Lifeline: 1-559.852.1924 - Hotline for transgender people of all ages  The Kt Project: 6-291-388-9660 - Hotline for LGBT youth     For Non-Emergency Support:   Fast Tracker: Mental Health & Substance Use Disorder Resources -   https://www.[facility].org/

## 2024-10-10 NOTE — PROGRESS NOTES
"Answers submitted by the patient for this visit:  Patient Health Questionnaire (Submitted on 10/10/2024)  PHQ9 TOTAL SCORE: 7  Virtual Visit Details    Type of service:  Video Visit     Originating Location (pt. Location): Home    Distant Location (provider location):  On-site  Platform used for Video Visit: [facility]  TRANSFER of CARE DIAGNOSTIC ASSESSMENT     CARE TEAM:    PCP- Yen Jauregui  Therapist- None    Luz is a 19 year old who uses the pronouns she, her, hers.   Identifies as a adhikari female     Chief Concern     \"I'm comfortable with my meds currently and I'm doing well mentally\"      Diagnoses     Major depressive disorder, recurrent, in full remission with seasonal pattern    NILS      Assessment     Luz Webber is a 20 yo with past psychiatric diagnosis of depression and anxiety who had been initially referred by Dr. Hanley from [facility] as her PCP was uncomfortable prescribing her psychiatric medications.  The patient was seen here in the BCT clinic, followed by one med visit  and one psychotherapy visit/assessment in our  general clinic in Jan 2024.  Returning to our clinic after 9 month gap (though her medication continued to be filled here).     Overall appears that Ernestinas anxiety and occasional depressive symptoms continue to be an issue though she is has learned to manage it over the years.  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  She feels that Effexor XR has been helpful for anxiety overall and she likes the medication.  She still reports periodic anxiety in relationships, and worrying about family dynamics. Endorses frequent, almost daily anxiety dreams.  Also experiences periods of 1 to 2 weeks of increased depressive sxs particularly fatigue and increased sleep, some irritability.  No safety " "concerns.  No apparent relationship to PMS or seasonality but will continue to monitor.  Has a QAMAR light but not using.  As she has been feeling more fatigued / stressed / taking longer naps last two weeks we discussed increasing Effexor XR to 187.5 mg.  We also discussed optimizing her sleep but shortening her naps from 5 hrs to 1.5 hrs, avoiding caffeine at bedtime,and taking Melatonin to help her sleep.  As future options we discussed gabapentin or flexeril.  Will continue to discuss caffeine and THC gummies use in future appointments.     We discussed medication indications, risks vs benefits, The risk of serotonin syndrome with triptans also reviewed.      We discussed role of psychotherapy to address her relationship dynamics and allow her to process own emotions.  She endorses having some anxiety about doing therapy.  She is interested in exploring this further (\"doing some practice therapy\") in our future appointments.       Future Considerations:  -Consider optimizing Venlafaxine to 225 if depressive symptoms were to re-emerge.  -Hydroxyzine has been helpful for anxiety and if this was to worsen, consider using propranolol, gabapentin or Buspar or flexeril   -Planning to find a psychiatrist for her in the community if she remains stable in the next visit.    Psychotropic Drug Interactions:  [PSYCHCLINICDDI]  ADDITIVE SEROTONERGIC: Sumatriptan, Venlafaxine  Management: routine monitoring and patient aware of risks     [place]NPMP was not checked today: not using controlled substances    Risk Statements:   Treatment Risk- Risks, benefits, alternatives and potential adverse effects have been discussed and are understood.   Safety Risk-Luz did not appear to be an imminent safety risk to self or others.     Plan     1) Medications:   - Increase venlafaxine to 187.5 mg (150 mg plus 37.5 mg) daily  - Continue hydroxyzine to 25-50 mg PO BID PRN, anxiety  - Start Melatonin 1 mg po at bedtime (also discussed a future " option of gabapentin or muscle relaxant due to hx of pain at night)      Other:   -OTC. Vitamin D supplementation (50 mcg daily) for Vitamin D insufficiency   -Sumatriptan 50 mg PO may repeat q 2 hrs, max of 4 tabs in 24 hrs. Prescribed by PCP--Patient advised to re-contact her PCP to resume or referral for neurology/migraine care     2) Psychotherapy:   None currently, Pre contemplative about psychotherapy.  Will plan to have a joint med/therapy session next time to explore goals for therapy further    3) Next due:  Labs- Routine monitoring is not indicated for current psychotropic medication regimen   EKG- Routine monitoring is not indicated for current psychotropic medication regimen   Rating scales-  PHQ9, GAD7     4) Referrals:   None    5) LABs/Other:   -No labs today, recommend reconnecting with PCP, may repeat TSH if not done there  -Recommended continue working with PCP for migraine management/ref to Neuro.     6) Follow-up: Return to clinic in 4 weeks       Pertinent Background                                                   [most recent eval 01/09/24]     Luz is a reports that she has experienced anxiety her entire life, but symptoms noticeably worsened around middle school, at a time where she had some changes in friend groups. She first sought therapy in October 2020 at age 15, where she was diagnosed with depression and anxiety.  Has tried a couple of selective serotonin reuptake inhibitors (poorly tolerated, see table below), before starting on SNRI, Venlafaxine, starting in 2021-currently.  Initially treated by PCP but later seen at the [facility] since October 2021 to June 2023. Transferred to PCP (uncomfortable prescribing antidepressant) and then adult  here in Jan 2024.  Individual Therapy: from 2021 to 2021 (CBT).  Med Hx: Struggles with some chronic back and shoulder pain as well as migraines.    Pertinent items include: [N/A] no prior hospitalizations, no IOP/PHP, no SI/SA hx, no  "psychosis.      Hx of father having heart issues and having a serious MVA (traumatic event for the patient); hx of perfectionism and excessive worry about other people's feelings.  Hx of \"growing up too fast\"/ having a lot of responsibility at younger age.      Subjective     -The patient presents for a transfer of care visit after not being seen for 9 months  -\"I am pretty busy, lost track of time\"...\"Happy with my medications\"  -Wants better treatment form migraines as they are unpredictable, last month she had total 3 migraines, can brought on by stress or hormonal changes.  Currently her PCP manages.  She was on sumatriptan but ran out in the summer.  Uses OTC meds and ice packs  -Had shoulder and back injury playing sports in HS.  Shoulder surgery has been helpful but continues to live with some chronic pain (see below)    -Anxiety:  OK, \"manageable\", enjoys her routines, more comfortable working than in school.  Used to worry about grades, sports (hockey), events, people.  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   Likes it a lot, \"a lot of work but confident about doing it\". Enjoys it but \"that's all I do\". Works 6:30-2 or 3 pm.  Afterwards, does some work at home/business management  -Some work stress but anxiety can come up in relationships mostly.  Has two sisters, is a middle child.  She feels being 'put in the middle', also 'I am a connection for different family members\".  \"Complicated and no one I can be completely honest with\".  Reports having to do a lot of management of all family relationships. Lives with parents and younger sister.  Likes living there but relationships can be a stressor.  ' trying to keep the peace.. avoid drama'.   Feels more mature than her sisters / \"always have to be the adult even with my parents sometimes\".  " "Dislikes conflict.  Panic attacks: no panic attacks since HS but can experiences anxiety waves with heart racing, HR in 100s, lasting 1-3 hrs, sxs of itching/scratching her legs, sweating, \"often running hot\".     Mood:   hx of some depression in the past, now 'job keeps me going and gives me purpose\"  However there are times when very exhausted/ energy only for work.  Sleeps after work, up to 5 hrs then goes back to bed at 11 am for 7 hrs (up 12 hrs).  Typically if not depressed naps 1-2 hrs.  Not related to her cycle. This has been last 2 weeks but may last for 1 week and happens every couple of months or so.  Also notices more irritability, no hopelessness.  No crying.  No SI or SIB.    Appetite:  ok, no weight changes.    Energy: can be low, caffeine 3 cups in AM, 2 more during the day, last caffeine drink is 3 or 4 pm    Has SAD light but has not used it.  Not sure if there is a seasonal component.  Some of her depressive episodes have been in the summer.     Sleep:  more, up to 12 hr past 2 weeks; The nap after work usually is 1-2 hrs, now 5 hrs.   \"Sleep is hard for me\", hard to fall asleep and staying asleep especially after long nap.    Has nightmares of ' being stuck under water' most nights for several months; or dreams of throwing up (has anxiety about throwing up)    Pain level:  \"manageable\" but times has backpain pain/tingling; shoulder pain is better after surgery but surgery didn't help migraines.  Occasionally takes ibuprofen, sees a chiropractor [id number].       Safety: Denies SI/plan/intent, denies HI or other safety concerns      Med:  Takes Effexor  mg at bedtime.  Reports good tolerability and no SE      Recent Psych Symptoms:   Depression:  low energy, insomnia, and hypersomnia  Elevated:  none  Psychosis:  none  Anxiety:  excessive worry although this has been better after the holidays. Currently denies worriees out of the ordinary   Trauma Related:  None. Denies hx of trauma.  Other:  " "No    Current Social History:  Financial/occupational: Self-employed. 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  Living situation (partner, children, pets, etc):  Lives at parents home. Has an older (half-sister, 24 yo, finishing grad school in psychology, sex therapist) and a younger sister (15 yo). Lives with bio mom and dad and younger sisters.   Social/spiritual support: sisters, mom, some friends.   Feels safe at home: Yes    Pertinent Substance Use:   Alcohol: Yes: Casual glass of wine with family during the holidays.    Cannabis: Yes: 1 THC gummy (5 mg) once a month, given by her dad. Denies problematic use, cravings, noticing impact in mood or anxiety or other concerns currently. Continue to monitor closely/offer resources as needed.   Tobacco: No  Caffeine:  Yes: Drinks 4-5 cups a day. May use it in the late afternoon/evening.   Opioids: No   Narcan Kit current: No  Other substances: none    Medical Review of Systems:   Lightheadedness/orthostasis: None  Headaches: Migraine headaches. Also reports runs in her family. Gets 3-5 episodes/month. Started after concussion in HS when playing hockey. LOC for ~5 seconds once. Never imaging or needing to go to the hospital. No seizure hx.  GI: none  Sexual health concerns: Denies being sexually activive     A comprehensive review of systems was performed and is negative other than noted above.    Contraception: None     Mental Status Exam     Alertness: alert  and oriented  Appearance: well groomed  Behavior/Demeanor: cooperative, pleasant, and calm, with good  eye contact   Speech: regular rate and rhythm  Language: intact and no problems  Psychomotor: normal or unremarkable  Mood: \"feeling well\"   Affect: full range; congruent to: mood- yes, content- yes  Thought Process/Associations: unremarkable  Thought Content:  Reports none;  Denies suicidal & violent ideation and delusions, delusions , obsessions , phobia , magical thinking, over-valued " "ideas, and paranoid ideation  Perception:  Reports none;  Denies auditory hallucinations, visual hallucinations, visual distortion seen as shadows , depersonalization, and derealization  Insight: good  Judgment: good  Cognition: does  appear grossly intact; formal cognitive testing was not done  Gait and Station: N/A (telehealth)     Social History                                 pt reported     Financial/employment: 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   Living situation/family: mother (Radha), father and younger sister. Gets along well with family members. Also has older half-sister who she is close to.   Children: No  Social/spiritual support: See above for current.  Education: Completed HS, Completed PSEO at [employer]. Reports getting \"decent\" grades, in good academic standing. No history of 504 or IEP.   Early history:   Raised by: biological parents   Legal:  Denies      Family Mental Health History                                 pt reported     Mother: MDD, anxiety  Dad: Seasonal depression, anxiety.   Denies hx of bipolar disorder or psychotic disorders   Paternal  grandparent some \"heart issues.      Past Psychiatric History     Self injurious behavior [method, most recent]: No  Suicide attempt [#, most recent, method]: No  Suicidal ideation hx [passive, active]: No    Violence/Aggression Hx:No   Psychosis Hx: No   Eating Disorder Hx: No  Trauma hx: No    Psych Hosp [#, most recent]: No   Commitment: No   TMS/ECT: No   Outpatient Programs [Day treatment, DBT, eating disorder tx, etc]: No    SUBSTANCE USE HISTORY   Past Use: No  Treatment [#, most recent]: No   Medical Consequences: No   Legal Consequences: No      Past Psych Med Trials        Medication Max Dose (mg) Dates / Duration Helpful? DC Reason / Adverse Effects?   Sertraline   For 1 month Feb 2020  N caused GI upset, discontinued    Fluoxetine  Up to 40 mg Mar - Oct 2020  Partially  caused nausea, only partially " helpful for anxiety, emotional blunting motional blunting, and pt was tending less to her needs (messy room, forgot to feed gecko and it passed away). Depression worse with increased dose   Venlafaxine 150 mg -->187.5 mg October 2021-present   Increase to 187.5 mg Oct 2024 Y              Vitals   There were no vitals taken for this visit.  Pulse Readings from Last 3 Encounters:   02/13/24 83   09/15/23 97   05/26/23 101     Wt Readings from Last 3 Encounters:   02/13/24 53.7 kg (118 lb 7 oz)   02/07/24 52.2 kg (115 lb)   09/15/23 53.4 kg (117 lb 11.2 oz) (30%, Z= -0.53)*     * Growth percentiles are based on [facility] (Girls, 2-20 Years) data.     BP Readings from Last 3 Encounters:   02/13/24 128/86   09/15/23 118/77   05/26/23 137/88        Medical History     ALLERGIES: Patient has no known allergies.    Patient Active Problem List   Diagnosis    Acne vulgaris    Generalized anxiety disorder    Major depressive disorder, recurrent, moderate (H)    Encounter for preventive care    Spondylolysis    Migraine with aura and with status migrainosus, not intractable    Medication monitoring encounter    Kyphosis (acquired) (postural)    Chronic right shoulder pain    Preop general physical exam        Medications     Current Outpatient Medications   Medication Sig Dispense Refill    adapalene (DIFFERIN) 0.1 % external cream [ADAPALENE (DIFFERIN) 0.1 % CREAM] Apply to affected area nightly 45 g 1    cholecalciferol, vitamin D3, (VITAMIN D3 ORAL) [CHOLECALCIFEROL, VITAMIN D3, (VITAMIN D3 ORAL)] Take by mouth.      hydrOXYzine luis manuel (VISTARIL) 25 MG capsule Take 1-2 capsules (25-50 mg) by mouth 2 times daily as needed for anxiety 120 capsule 3    SUMAtriptan (IMITREX) 50 MG tablet Take 1 tablet (50 mg) by mouth at onset of headache for migraine May repeat in 2 hours. Max 4 tablets/24 hours. 12 tablet 3    venlafaxine (EFFEXOR XR) 150 MG 24 hr capsule Take 1 capsule (150 mg) by mouth daily. 30 capsule 0        Labs and Data          "1/10/2024     8:02 AM 1/10/2024     8:15 AM 1/23/2024     6:42 AM   PROMIS-10 Total Score w/o Sub Scores   PROMIS TOTAL - SUBSCORES 25 25 21         1/10/2024     8:02 AM 1/10/2024     8:15 AM   CAGE-AID Total Score   Total Score 0 0   Total Score MyChart  0 (A total score of 2 or greater is considered clinically significant)         1/10/2024     7:47 AM 1/10/2024     8:14 AM 1/23/2024     6:41 AM   PHQ-9 SCORE   PHQ-9 Total Score MyChart  9 (Mild depression) 10 (Moderate depression)   PHQ-9 Total Score 9 9 10         5/26/2023     3:05 PM 1/10/2024     8:00 AM 1/10/2024     8:15 AM   NILS-7 SCORE   Total Score 10 (moderate anxiety)  9 (mild anxiety)   Total Score 10 9 9       Liver/Kidney Function, TSH Metabolic Blood counts   Recent Labs   Lab Test 09/15/23  1405 06/01/23  1051   AST 24 20   ALT 16 16   ALKPHOS 54 61   CR 0.70 0.78     Recent Labs   Lab Test 03/19/21  1530   TSH 1.31    Recent Labs   Lab Test 06/01/23  1051   CHOL 179*   TRIG 64   LDL 97   HDL 69     No lab results found.  Recent Labs   Lab Test 09/15/23  1405   GLC 89    Recent Labs   Lab Test 02/13/24  1134   WBC 5.7   HGB 14.5   HCT 42.7   MCV 86             October/2021 ECG  QTc = 463 ms, followed by 12-day Holer study for \"irregular heartbeat\" with normal results, per records.    Psychiatry Individual Psychotherapy Note   Psychotherapy start time -   Psychotherapy end time -   Date treatment plan last reviewed with patient - 10/10/24  Subjective: This supportive psychotherapy session addressed issues related to goals of therapy and current psychosocial stressors. Patient's reaction: Pre-contemplation in the context of mental status appropriate for ambulatory setting.    Interactive complexity indicated? No  Plan: RTC in timeframe noted above  Psychotherapy services during this visit included myself and the patient.   Treatment Plan      SYMPTOMS; PROBLEMS   MEASURABLE GOALS;    FUNCTIONAL IMPROVEMENT / GAINS INTERVENTIONS DISCHARGE " CRITERIA   Anxiety, social anxiety, nightmares and panic attacks   learn best practices for sleep, reduce panic attacks/ excessive worry, and reduce nightmares Supportive / psychodynamic marked symptom improvement   Relationship anxiety and stress Learn to deal with conflict and become more comfortable ,relaxed, and authentic in close relationships  Supportive / psychodynamic marked symptom improvement   [id number]}     Billing statement based on time: On the date of service, I spent a total of 133 minutes reviewing the EMR, meeting with the patient, coordinating care, and documenting in the EMR.  PROVIDER: Lois Barragan MD

## 2024-11-07 ENCOUNTER — VIRTUAL VISIT (OUTPATIENT)
Dept: PSYCHIATRY | Facility: CLINIC | Age: 19
End: 2024-11-07
Attending: PSYCHIATRY & NEUROLOGY
Payer: COMMERCIAL

## 2024-11-07 VITALS — HEIGHT: 63 IN | BODY MASS INDEX: 20.38 KG/M2 | WEIGHT: 115 LBS

## 2024-11-07 DIAGNOSIS — F41.1 GENERALIZED ANXIETY DISORDER: ICD-10-CM

## 2024-11-07 PROCEDURE — 99214 OFFICE O/P EST MOD 30 MIN: CPT | Mod: 95 | Performed by: PSYCHIATRY & NEUROLOGY

## 2024-11-07 PROCEDURE — 90833 PSYTX W PT W E/M 30 MIN: CPT | Mod: 95 | Performed by: PSYCHIATRY & NEUROLOGY

## 2024-11-07 RX ORDER — VENLAFAXINE HYDROCHLORIDE 37.5 MG/1
CAPSULE, EXTENDED RELEASE ORAL
Qty: 30 CAPSULE | Refills: 1 | Status: CANCELLED | OUTPATIENT
Start: 2024-11-07

## 2024-11-07 RX ORDER — VENLAFAXINE HYDROCHLORIDE 150 MG/1
150 CAPSULE, EXTENDED RELEASE ORAL DAILY
Qty: 30 CAPSULE | Refills: 1 | Status: SHIPPED | OUTPATIENT
Start: 2024-11-07

## 2024-11-07 RX ORDER — BUSPIRONE HYDROCHLORIDE 5 MG/1
5 TABLET ORAL 2 TIMES DAILY
Qty: 60 TABLET | Refills: 1 | Status: SHIPPED | OUTPATIENT
Start: 2024-11-07

## 2024-11-07 ASSESSMENT — PAIN SCALES - GENERAL: PAINLEVEL_OUTOF10: MILD PAIN (3)

## 2024-11-07 NOTE — NURSING NOTE
Current patient location: [address] [place] [address]    Is the patient currently in the state of [place]? YES    Visit mode:VIDEO    If the visit is dropped, the patient can be reconnected by: VIDEO VISIT: Text to cell phone:   Telephone Information:   Mobile [phone]       Will anyone else be joining the visit? NO  (If patient encounters technical issues they should call [phone])    Are changes needed to the allergy or medication list? No    Are refills needed on medications prescribed by this physician? YES    Rooming Documentation:  Questionnaire(s) completed    Reason for visit: RECHECK    Raeann BANKSF

## 2024-11-07 NOTE — PROGRESS NOTES
"  Virtual Visit Details  November 7, 2024   Type of service:  Video Visit     Originating Location (pt. Location): Home    Distant Location (provider location):  On-site  Platform used for Video Visit: [facility]  TRANSFER of CARE DIAGNOSTIC ASSESSMENT     CARE TEAM:    PCP- Yen Jauregui  Therapist- None    Luz is a 19 year old who uses the pronouns she, her, hers.   Identifies as a adhikari female     Chief Concern     \"I'm comfortable with my meds currently and I'm doing well mentally\"      Diagnoses     Major depressive disorder, recurrent, in full remission with seasonal pattern    NILS      Assessment     Luz Webber is a 20 yo with past psychiatric diagnosis of depression and anxiety who had been initially referred by Dr. Hanley from [facility] as her PCP was uncomfortable prescribing her psychiatric medications.  The patient was seen here in the [facility] clinic, followed by one med visit  and one psychotherapy visit/assessment in our  general clinic in Jan 2024.  Returning to our clinic after 9 month gap (though her medication continued to be filled here).     Overall, Veronique reports coping ok but continues to experience anxiety, and occasionally lower mood.  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  Hx of restlessness mixed with anxiety when having some time off.  Some difficulties delegating and taking time off and we discussed ways to create systems for other to take over.  Continues to be pre contemplative re: psychotherapy.    Sleep improved with shorter naps and some caffeine reduction.   We explored the possibility of ADHD as she tends to be very much on the go.  She does have positive family hx (father and sister with ADHD) but no hx of school difficulties or disorganization.  Will continue to explore in more structured way next visit.     As she experiences " anxiety and did not like going up on Effexor XR, we will do a trial of Buspar 5 mg bid.  SE/R/B discussed.     Future Considerations:  -Consider optimizing Venlafaxine to 225 if depressive symptoms were to re-emerge.  -Gabapentin or flexeril  -Hydroxyzine has been helpful for anxiety and if this was to worsen, consider using propranolol, gabapentin or Buspar or flexeril   -Planning to find a psychiatrist for her in the community if she remains stable in the next visit.    Psychotropic Drug Interactions:  [PSYCHCLINICDDI]  ADDITIVE SEROTONERGIC: Sumatriptan, Venlafaxine  Management: routine monitoring and patient aware of risks     MNPMP was not checked today: not using controlled substances    Risk Statements:   Treatment Risk- Risks, benefits, alternatives and potential adverse effects have been discussed and are understood.   Safety Risk-Luz did not appear to be an imminent safety risk to self or others.     Plan     1) Medications:   - Go back to Effexor  mg at bedtime  -Add Buspar 5 mg po BID  - Continue hydroxyzine to 25-50 mg PO BID PRN, anxiety  - Melatonin 1 mg po at bedtime (also discussed a future option of gabapentin or muscle relaxant due to hx of pain at night)      Other:   -OTC. Vitamin D supplementation (50 mcg daily) for Vitamin D insufficiency   -Sumatriptan 50 mg PO may repeat q 2 hrs, max of 4 tabs in 24 hrs. Prescribed by PCP--Patient advised to re-contact her PCP to resume or referral for neurology/migraine care     2) Psychotherapy:   None currently, Pre contemplative about psychotherapy.  Will plan to have a joint med/therapy session next time to explore goals for therapy further    3) Next due:  Labs- Routine monitoring is not indicated for current psychotropic medication regimen   EKG- Routine monitoring is not indicated for current psychotropic medication regimen   Rating scales-  PHQ9, GAD7     4) Referrals:   None    5) LABs/Other:   -No labs today, recommend reconnecting with  "PCP, may repeat TSH if not done there  -Recommended continue working with PCP for migraine management/ref to Neuro.     6) Follow-up: Return to clinic in 4 weeks       Pertinent Background                                                   [most recent eval 01/09/24]     Luz is a reports that she has experienced anxiety her entire life, but symptoms noticeably worsened around middle school, at a time where she had some changes in friend groups. She first sought therapy in October 2020 at age 15, where she was diagnosed with depression and anxiety.  Has tried a couple of selective serotonin reuptake inhibitors (poorly tolerated, see table below), before starting on SNRI, Venlafaxine, starting in 2021-currently.  Initially treated by PCP but later seen at the [facility] clinic since October 2021 to June 2023. Transferred to PCP (uncomfortable prescribing antidepressant) and then adult  here in Jan 2024.  Individual Therapy: from 2021 to 2021 (CBT).  Med Hx: Struggles with some chronic back and shoulder pain as well as migraines.    Pertinent items include: [N/A] no prior hospitalizations, no IOP/PHP, no SI/SA hx, no psychosis.      Hx of father having heart issues and having a serious MVA (traumatic event for the patient); hx of perfectionism and excessive worry about other people's feelings.  Hx of \"growing up too fast\"/ having a lot of responsibility at younger age.      Subjective   Today the patient who is a good historian reports the following:  -\"Doing good\"  - Reports being affected by the elections week and feeling more stressed because of it.  Also working more this week but hopes this will get better next week  -Anxiety:  \"Hard to gangue\".  Appears to have more anxiety \"when there is nothing to do\".  Describes times of being tired, having more time in the evening yet feeling restless and anxious.   Often feels anxiety when 'lazy and idle\".  Likely lance with anxiety or depression by keeping busy and the job " "(coffee shop owner) takes much of her energy.  Has difficulties delegating or taking time off.   -No panic attacks  -Has lowered caffeine use due to stomach upset, trying to shift to macha  -Mood/Safety:  Notes a downshift in mood due to weather but denies kwadwo depression, no hopelessness or SI.  No manic or hypomanic sxs  -Sleep: reports being better as she has limited her naps to 1-1.5 hrs.  Still can have periods of feeling more interrupted sleep.   -Pain: hx of chronic pain, mgt with chiropractic care and massage  -ADHD screen:  family hx of ADHD with father and sister having the diagnosis.  The patient states that their symptoms are \"classic\" with sister having difficulties with time management and disorganization, learning difficulties.   The patient denies problems with school and considers herself an organized person until she started her business.  States the lack of own space at the business or home limits her ability to be organized now.  No time management problems, rather the opposite, likes to be on time.  She does resonate with being \"a fast thinker\" and at times struggling with impatience and irritability.  Was very athletic in highOpenfolioool and did a lot of hockey and other sports.  Does resonate with having trouble relaxing and being on the go (sports in HS; job now).  Will continue to explore in our meetings with ADHD-combined applies to this patient.    Meds:  Tried taking Effexor XR 37.5 mg in AM and took it for 1 week.  She states that her sleep worsened by moving Effexor to AM and she also felt more lethargy and not feeling well during the day.  She decided to stop and now wants to stay on 150 mg at bedtime as before.  We discussed the possibility of trying the increase again, this time at bedtime but patient hesitant.   Discussed option of Buspar 5 mg bid for anxiety and the patient wants to try this to help with anxiety.       Recent Psych Symptoms:   Depression:  low energy, insomnia, and " "hypersomnia  Elevated:  none  Psychosis:  none  Anxiety:  excessive worry although this has been better after the holidays. Currently denies worriees out of the ordinary   Trauma Related:  None. Denies hx of trauma.  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  Living situation (partner, children, pets, etc):  Lives at parents home. Has an older (half-sister, 26 yo, finishing grad school in psychology, sex therapist) and a younger sister (15 yo). Lives with bio mom and dad and younger sisters.   Social/spiritual support: sisters, mom, some friends.   Feels safe at home: Yes    Pertinent Substance Use:   Alcohol: Yes: Casual glass of wine with family during the holidays.    Cannabis: Yes: 1 THC gummy (5 mg) once a month, given by her dad. Denies problematic use, cravings, noticing impact in mood or anxiety or other concerns currently. Continue to monitor closely/offer resources as needed.   Tobacco: No  Caffeine:  Yes: Drinks 4-5 cups a day. May use it in the late afternoon/evening.   Opioids: No   Narcan Kit current: No  Other substances: none    Medical Review of Systems:   Lightheadedness/orthostasis: None  Headaches: Migraine headaches. Also reports runs in her family. Gets 3-5 episodes/month. Started after concussion in HS when playing hockey. LOC for ~5 seconds once. Never imaging or needing to go to the hospital. No seizure hx.  GI: none  Sexual health concerns: Denies being sexually activive     A comprehensive review of systems was performed and is negative other than noted above.    Contraception: None     Mental Status Exam     Alertness: alert  and oriented  Appearance: well groomed  Behavior/Demeanor: cooperative, pleasant, and calm, with good  eye contact   Speech: regular rate and rhythm  Language: intact and no problems  Psychomotor: normal or unremarkable  Mood: \"feeling well\"   Affect: full range; " "congruent to: mood- yes, content- yes  Thought Process/Associations: unremarkable  Thought Content:  Reports none;  Denies suicidal & violent ideation and delusions, delusions , obsessions , phobia , magical thinking, over-valued ideas, and paranoid ideation  Perception:  Reports none;  Denies auditory hallucinations, visual hallucinations, visual distortion seen as shadows , depersonalization, and derealization  Insight: good  Judgment: good  Cognition: does  appear grossly intact; formal cognitive testing was not done  Gait and Station: N/A (telehealth)     Social History                                 pt reported     Financial/employment: 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   Living situation/family: mother (Radha), father and younger sister. Gets along well with family members. Also has older half-sister who she is close to.   Children: No  Social/spiritual support: See above for current.  Education: Completed HS, Completed PSEO at [employer]. Reports getting \"decent\" grades, in good academic standing. No history of 504 or IEP.   Early history:   Raised by: biological parents   Legal:  Denies      Family Mental Health History                                 pt reported     Mother: MDD, anxiety  Dad: Seasonal depression, anxiety.   Denies hx of bipolar disorder or psychotic disorders   Paternal  grandparent some \"heart issues.      Past Psychiatric History     Self injurious behavior [method, most recent]: No  Suicide attempt [#, most recent, method]: No  Suicidal ideation hx [passive, active]: No    Violence/Aggression Hx:No   Psychosis Hx: No   Eating Disorder Hx: No  Trauma hx: No    Psych Hosp [#, most recent]: No   Commitment: No   TMS/ECT: No   Outpatient Programs [Day treatment, DBT, eating disorder tx, etc]: No    SUBSTANCE USE HISTORY   Past Use: No  Treatment [#, most recent]: No   Medical Consequences: No   Legal Consequences: No      Past Psych Med Trials        " Medication Max Dose (mg) Dates / Duration Helpful? DC Reason / Adverse Effects?   Sertraline   For 1 month Feb 2020  N caused GI upset, discontinued    Fluoxetine  Up to 40 mg Mar - Oct 2020  Partially  caused nausea, only partially helpful for anxiety, emotional blunting motional blunting, and pt was tending less to her needs (messy room, forgot to feed gecko and it passed away). Depression worse with increased dose   Venlafaxine 150 mg -->187.5 mg October 2021-present   Increase to 187.5 mg Oct 2024 Y              Vitals   There were no vitals taken for this visit.  Pulse Readings from Last 3 Encounters:   02/13/24 83   09/15/23 97   05/26/23 101     Wt Readings from Last 3 Encounters:   10/10/24 52.2 kg (115 lb)   02/13/24 53.7 kg (118 lb 7 oz)   02/07/24 52.2 kg (115 lb)     BP Readings from Last 3 Encounters:   02/13/24 128/86   09/15/23 118/77   05/26/23 137/88        Medical History     ALLERGIES: Patient has no known allergies.    Patient Active Problem List   Diagnosis    Acne vulgaris    Generalized anxiety disorder    Major depressive disorder, recurrent, moderate (H)    Encounter for preventive care    Spondylolysis    Migraine with aura and with status migrainosus, not intractable    Medication monitoring encounter    Kyphosis (acquired) (postural)    Chronic right shoulder pain    Preop general physical exam        Medications     Current Outpatient Medications   Medication Sig Dispense Refill    adapalene (DIFFERIN) 0.1 % external cream [ADAPALENE (DIFFERIN) 0.1 % CREAM] Apply to affected area nightly 45 g 1    cholecalciferol, vitamin D3, (VITAMIN D3 ORAL) [CHOLECALCIFEROL, VITAMIN D3, (VITAMIN D3 ORAL)] Take by mouth.      hydrOXYzine luis manuel (VISTARIL) 25 MG capsule Take 1-2 capsules (25-50 mg) by mouth 2 times daily as needed for anxiety 120 capsule 3    SUMAtriptan (IMITREX) 50 MG tablet Take 1 tablet (50 mg) by mouth at onset of headache for migraine May repeat in 2 hours. Max 4 tablets/24 hours. 12  "tablet 3    venlafaxine (EFFEXOR XR) 150 MG 24 hr capsule Take 1 capsule (150 mg) by mouth daily. 30 capsule 1    venlafaxine (EFFEXOR XR) 37.5 MG 24 hr capsule 1 cap by mouth daily (to be taken together with venlafaxine  mg for a total daily dose of 187.5 mg) 30 capsule 1        Labs and Data         1/10/2024     8:15 AM 1/23/2024     6:42 AM 10/10/2024    10:53 AM   PROMIS-10 Total Score w/o Sub Scores   PROMIS TOTAL - SUBSCORES 25 21 25         1/10/2024     8:02 AM 1/10/2024     8:15 AM   CAGE-AID Total Score   Total Score 0 0   Total Score MyChart  0 (A total score of 2 or greater is considered clinically significant)         1/10/2024     8:14 AM 1/23/2024     6:41 AM 10/10/2024    10:51 AM   PHQ-9 SCORE   PHQ-9 Total Score MyChart 9 (Mild depression) 10 (Moderate depression) 7 (Mild depression)   PHQ-9 Total Score 9 10 7         5/26/2023     3:05 PM 1/10/2024     8:00 AM 1/10/2024     8:15 AM   NILS-7 SCORE   Total Score 10 (moderate anxiety)  9 (mild anxiety)   Total Score 10 9 9       Liver/Kidney Function, TSH Metabolic Blood counts   Recent Labs   Lab Test 09/15/23  1405 06/01/23  1051   AST 24 20   ALT 16 16   ALKPHOS 54 61   CR 0.70 0.78     Recent Labs   Lab Test 03/19/21  1530   TSH 1.31    Recent Labs   Lab Test 06/01/23  1051   CHOL 179*   TRIG 64   LDL 97   HDL 69     No lab results found.  Recent Labs   Lab Test 09/15/23  1405   GLC 89    Recent Labs   Lab Test 02/13/24  1134   WBC 5.7   HGB 14.5   HCT 42.7   MCV 86             October/2021 ECG  QTc = 463 ms, followed by 12-day Holer study for \"irregular heartbeat\" with normal results, per records.    Psychiatry Individual Psychotherapy Note   Psychotherapy start time - 2:15  Psychotherapy end time - 2:45  Date treatment plan last reviewed with patient - 10/10/24  Subjective: This supportive psychotherapy session addressed issues related to goals of therapy and current psychosocial stressors. Patient's reaction: Pre-contemplation in " the context of mental status appropriate for ambulatory setting.    Interactive complexity indicated? No  Plan: RTC in timeframe noted above  Psychotherapy services during this visit included myself and the patient.   Treatment Plan      SYMPTOMS; PROBLEMS   MEASURABLE GOALS;    FUNCTIONAL IMPROVEMENT / GAINS INTERVENTIONS DISCHARGE CRITERIA   Anxiety, social anxiety, and panic attacks   Learn coping and assertiveness skills, understand triggers for anxiety, learning emotional regulation skills Supportive / psychodynamic marked symptom improvement   anxiety and stress Stress management, skills of balancing work and relaxation Supportive / psychodynamic marked symptom improvement   0956}     Billing statement based on time: On the date of service, I spent a total of 133 minutes reviewing the EMR, meeting with the patient, coordinating care, and documenting in the EMR.  PROVIDER: Lois Barragan MD

## 2024-11-07 NOTE — PATIENT INSTRUCTIONS
**For crisis resources, please see the information at the end of this document**   Patient Education    Thank you for coming to the [facility] [place] [facility].     Lab Testing:  If you had lab testing today and your results are reassuring or normal they will be mailed to you or sent through [employer] within 7 days. If the lab tests need quick action we will call you with the results. The phone number we will call with results is # [phone]. If this is not the best number please call our clinic and change the number.     Medication Refills:  If you need any refills please call your pharmacy and they will contact us. Our fax number for refills is [phone].   Three business days of notice are needed for general medication refill requests.   Five business days of notice are needed for controlled substance refill requests.   If you need to change to a different pharmacy, please contact the new pharmacy directly. The new pharmacy will help you get your medications transferred.     Contact Us:  Please call [phone] during business hours (8-5:00 M-F).   If you have medication related questions after clinic hours, or on the weekend, please call [phone].     Financial Assistance [phone]   Medical Records [phone]       MENTAL HEALTH CRISIS RESOURCES:  For a emergency help, please call 911 or go to the nearest Emergency Department.     Emergency Walk-In Options:   EmPATH Unit @ [facility] ([place]): [phone] - Specialized mental health emergency area designed to be calming  [facility] ([place]): [phone]  [facility] ([place]): [phone]  [facility]): [phone]    [facility] Crisis Information:   [place]: [phone]  [place]: [phone]  Jasbir (RICHARD) - Adult: [phone]     Child: [phone]  [place] - Adult: [phone]     Child: [phone]  [place]:  [phone]  List of all [facility] resources:   https://[place].gov/dhs/people-we-serve/adults/health-care/mental-health/resources/crisis-contacts.jsp    National Crisis Information:   Crisis Text Line: Text  [place]  to 057348  Suicide & Crisis Lifeline: 988  National Suicide Prevention Lifeline: 6-646-193-TALK (1-482.153.4801)       For online chat options, visit https://suicidepreventionlifeline.org/chat/  Poison Control Center: 7-948-202-0687  Trans Lifeline: 3-569-856-8413 - Hotline for transgender people of all ages  The Kt Project: 6-384-087-9485 - Hotline for LGBT youth     For Non-Emergency Support:   Fast Tracker: Mental Health & Substance Use Disorder Resources -   https://www.[employer].org/

## 2024-12-05 ENCOUNTER — VIRTUAL VISIT (OUTPATIENT)
Dept: PSYCHIATRY | Facility: CLINIC | Age: 19
End: 2024-12-05
Attending: PSYCHIATRY & NEUROLOGY
Payer: COMMERCIAL

## 2024-12-05 VITALS — BODY MASS INDEX: 20.38 KG/M2 | WEIGHT: 115 LBS | HEIGHT: 63 IN

## 2024-12-05 DIAGNOSIS — F41.1 GENERALIZED ANXIETY DISORDER: ICD-10-CM

## 2024-12-05 RX ORDER — VENLAFAXINE HYDROCHLORIDE 150 MG/1
150 CAPSULE, EXTENDED RELEASE ORAL DAILY
Qty: 30 CAPSULE | Refills: 1 | Status: SHIPPED | OUTPATIENT
Start: 2024-12-05

## 2024-12-05 ASSESSMENT — PATIENT HEALTH QUESTIONNAIRE - PHQ9
10. IF YOU CHECKED OFF ANY PROBLEMS, HOW DIFFICULT HAVE THESE PROBLEMS MADE IT FOR YOU TO DO YOUR WORK, TAKE CARE OF THINGS AT HOME, OR GET ALONG WITH OTHER PEOPLE: SOMEWHAT DIFFICULT
SUM OF ALL RESPONSES TO PHQ QUESTIONS 1-9: 9
SUM OF ALL RESPONSES TO PHQ QUESTIONS 1-9: 9

## 2024-12-05 ASSESSMENT — PAIN SCALES - GENERAL: PAINLEVEL_OUTOF10: MILD PAIN (3)

## 2024-12-05 NOTE — PATIENT INSTRUCTIONS
**For crisis resources, please see the information at the end of this document**   Patient Education    Thank you for coming to the [facility] [place] [facility].     Lab Testing:  If you had lab testing today and your results are reassuring or normal they will be mailed to you or sent through [employer] within 7 days. If the lab tests need quick action we will call you with the results. The phone number we will call with results is # [phone]. If this is not the best number please call our clinic and change the number.     Medication Refills:  If you need any refills please call your pharmacy and they will contact us. Our fax number for refills is [phone].   Three business days of notice are needed for general medication refill requests.   Five business days of notice are needed for controlled substance refill requests.   If you need to change to a different pharmacy, please contact the new pharmacy directly. The new pharmacy will help you get your medications transferred.     Contact Us:  Please call [phone] during business hours (8-5:00 M-F).   If you have medication related questions after clinic hours, or on the weekend, please call [phone].     Financial Assistance [phone]   Medical Records [phone]       MENTAL HEALTH CRISIS RESOURCES:  For a emergency help, please call 911 or go to the nearest Emergency Department.     Emergency Walk-In Options:   EmPATH Unit @ [facility] ([place]): [phone] - Specialized mental health emergency area designed to be calming  [facility] ([place]): [phone]  [facility] ([place]): [phone]  [facility]): [phone]    [facility] Crisis Information:   [place]: [phone]  [place]: [phone]  Jasbir (RICHARD) - Adult: [phone]     Child: [phone]  Mal[place] - Adult: [phone]     Child: [phone]  [place]:  [phone]  List of all [facility] resources:   https://[place].gov/dhs/people-we-serve/adults/health-care/mental-health/resources/crisis-contacts.jsp    National Crisis Information:   Crisis Text Line: Text  [place]  to 299988  Suicide & Crisis Lifeline: 988  National Suicide Prevention Lifeline: 1-562-798-TALK (1-232.676.4241)       For online chat options, visit https://suicidepreventionlifeline.org/chat/  Poison Control Center: 4-739-342-3980  Trans Lifeline: 1-560-782-2989 - Hotline for transgender people of all ages  The Kt Project: 1-324-236-7781 - Hotline for LGBT youth     For Non-Emergency Support:   Fast Tracker: Mental Health & Substance Use Disorder Resources -   https://www.[employer].org/

## 2024-12-05 NOTE — NURSING NOTE
Current patient location: [address] [place] [id number]    Is the patient currently in the state of [place]? YES    Visit mode:VIDEO    If the visit is dropped, the patient can be reconnected by:VIDEO VISIT: Text to cell phone:   Telephone Information:   Mobile [phone]       Will anyone else be joining the visit? NO  (If patient encounters technical issues they should call [phone])    Are changes needed to the allergy or medication list? venlafaxine (EFFEXOR XR) 37.5 MG 24 hr capsule     Are refills needed on medications prescribed by this physician? YES    Rooming Documentation:  Patient will complete questionnaire(s) in [facility]    Reason for visit: RECHECK    Raeann BANKSF

## 2024-12-05 NOTE — PROGRESS NOTES
Answers submitted by the patient for this visit:  Patient Health Questionnaire (Submitted on 12/5/2024)  If you checked off any problems, how difficult have these problems made it for you to do your work, take care of things at home, or get along with other people?: Somewhat difficult  PHQ9 TOTAL SCORE: 9    Virtual Visit Details  November 7, 2024   Type of service:  Video Visit     Originating Location (pt. Location): Home    Distant Location (provider location):  On-site  Platform used for Video Visit: [facility]  TRANSFER of CARE DIAGNOSTIC ASSESSMENT     CARE TEAM:    PCP- Yen Jauregui  Therapist- None    Luz is a 19 year old who uses the pronouns she, her, hers.   Identifies as a ahdikari female     Diagnoses     Major depressive disorder, recurrent, in full remission with seasonal pattern    NILS      Assessment     Luz Webber is a 18 yo with past psychiatric diagnosis of depression and anxiety who had been initially referred by Dr. Hanley from [facility] as her PCP was uncomfortable prescribing her psychiatric medications.  The patient was seen here in the [facility] clinic, followed by one med visit  and one psychotherapy visit/assessment in our  general clinic in Jan 2024.  Returning to our clinic after 9 month gap (though her medication continued to be filled here).     Today, overall, Veronique reports coping ok but continues to experience some anxiety, and occasionally lower mood especially 2 weeks before her menses.  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  She has not started Buspar yet but open to giving it a try.  The patient talked more today about her hx of school anxiety as well as anxiety with transitions / relationships with siblings changing.  She is interested in psychotherapy referral and we will continue to meet q1 month until she is established with that.    Will continue  Effexor  mg for now.     She has hx of school anxiety, daydreaming, social anxiety and eventually deciding not to pursue collage.  Family hx of ADHD and we will continue to explore this for her in future appointments.       Future Considerations:  -Consider optimizing Venlafaxine to 225 if depressive symptoms were to re-emerge.  -Gabapentin or flexeril  -Hydroxyzine has been helpful for anxiety and if this was to worsen, consider using propranolol, gabapentin or Buspar or flexeril   -Planning to find a psychiatrist for her in the community if she remains stable in the next visit.    Psychotropic Drug Interactions:  [PSYCHCLINICDDI]  ADDITIVE SEROTONERGIC: Sumatriptan, Venlafaxine  Management: routine monitoring and patient aware of risks     MNPMP was not checked today: not using controlled substances    Risk Statements:   Treatment Risk- Risks, benefits, alternatives and potential adverse effects have been discussed and are understood.   Safety Risk-Luz did not appear to be an imminent safety risk to self or others.     Plan     1) Medications:   -Continue Effexor  mg at bedtime  -Start Buspar 5 mg po BID for anxiety and PMS sxs  - Continue hydroxyzine to 25-50 mg PO BID PRN, anxiety  - Melatonin 1 mg po at bedtime (also discussed a future option of gabapentin or muscle relaxant due to hx of pain at night)      Other:   -OTC. Vitamin D supplementation (50 mcg daily) for Vitamin D insufficiency   -Sumatriptan 50 mg PO may repeat q 2 hrs, max of 4 tabs in 24 hrs. Prescribed by PCP--Patient advised to re-contact her PCP to resume or referral for neurology/migraine care     2) Psychotherapy:   Open to a referral for therapy which was placed today    3) Next due:  Labs- Routine monitoring is not indicated for current psychotropic medication regimen   EKG- Routine monitoring is not indicated for current psychotropic medication regimen   Rating scales-  PHQ9, GAD7     4) Referrals:   None    5)  "LABs/Other:   -No labs today, recommend reconnecting with PCP, may repeat TSH if not done there  -Recommended continue working with PCP for migraine management/ref to Neuro.     6) Follow-up: Return to clinic in 4 weeks       Pertinent Background                                                   [most recent eval 01/09/24]     Luz is a reports that she has experienced anxiety her entire life, but symptoms noticeably worsened around middle school, at a time where she had some changes in friend groups. She first sought therapy in October 2020 at age 15, where she was diagnosed with depression and anxiety.  Has tried a couple of selective serotonin reuptake inhibitors (poorly tolerated, see table below), before starting on SNRI, Venlafaxine, starting in 2021-currently.  Initially treated by PCP but later seen at the [facility] clinic since October 2021 to June 2023. Transferred to PCP (uncomfortable prescribing antidepressant) and then adult  here in Jan 2024.  Individual Therapy: from 2021 to 2021 (CBT).  Med Hx: Struggles with some chronic back and shoulder pain as well as migraines.    Pertinent items include: [N/A] no prior hospitalizations, no IOP/PHP, no SI/SA hx, no psychosis.      Hx of father having heart issues and having a serious MVA (traumatic event for the patient); hx of perfectionism and excessive worry about other people's feelings.  Hx of \"growing up too fast\"/ having a lot of responsibility at younger age.      Subjective   Today the patient who is a good historian reports the following:    -\"Busy\"  -Likes feeling productive.  \"Easier to be productive than relaxing alone\"    -Mood: has trouble describing it but notice that it correlates with her menstrual cycle.  Less energy/more anxiety 2 weeks before. No overwhelm, hopelessness or SI however  -Anxiety:  can come up in relationships, when changes happen. Doesn't like change she says.    -Less caffeine overall  -Sleep: ok; feels exhausted after work " "and naps.  Her routine is 5 am-->works till 2 pm, nap 3-6 pm, gets up goes to bed at 10-11.  -Pain:  chronic pain same; trying to do more stretching  -Overall reflecting on where she is in her relationships with friends and siblings and worries about growing distance there.       Continue to explore ADHD family risk:  As a child:  -Math was hard; always anxious at school, asking for help was hard.  Did not want others to know that she was struggling (math and physics, spelling/grammar, Micronesian).  Some daydreaming or feeling distracted.  Worried about \"stomach growling\", anxious about public speaking, talking to teacher, avoided social parties/anxious about it,   -able to get homework in elementary school; if interested in class  -more trouble with math or Micronesian also very anxious there.  A lot of worry about those classes. A lot of anxiety about school and test scores (embarrassed about grades).  7th and 8th grade \"C\" in math, F at some tests.  GPA=3.4.    -Pushed herself in first and second year of HS, took AP but \"went on line because of COVID\" and never went back.  She reports \"I stopped caring\" in carrie and senior year of HS.  Was working at the coffee shop and \"found my passion\". Glad to not be in collage.  Was anticipating needing to make money for collage. Now parents are doing better financially and per patient spoiling her sister/parenting her very differently than the patient was treated.    -Feels more financially able and more mature than others in her family.  Patient feels sister gets to have new hockey gear while the patient had to use the same gear for many years.   -Loved being on the go, working out / play hockey, exercising, being busy all day in HS.  Also liked being connected to family via hockey.  All cousins play hockey.  Feeling disconnected from family now because age difference and not playing hockey as much.  Feeling like relationships with siblings is complicated and she is anxious " about relationships with her sisters.          Meds: On Effexor  mg qd  -Buspar 5 mg bid for anxiety to help with anxiety--has not tired it yet        Recent Psych Symptoms:   Depression:  some depression 2 weeks before her menses  Elevated:  none  Psychosis:  none  Anxiety:  excessive worry at times about relationships  Trauma Related:  None. Denies hx of trauma.  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  Living situation (partner, children, pets, etc):  Lives at parents home. Has an older (half-sister, 24 yo, finishing grad school in psychology, sex therapist) and a younger sister (15 yo). Lives with bio mom and dad and younger sisters.   Social/spiritual support: sisters, mom, some friends.   Feels safe at home: Yes    Pertinent Substance Use:   Alcohol: Yes: Casual glass of wine with family during the holidays.    Cannabis: Yes: 1 THC gummy (5 mg) once a month, given by her dad. Denies problematic use, cravings, noticing impact in mood or anxiety or other concerns currently. Continue to monitor closely/offer resources as needed.   Tobacco: No  Caffeine:  Yes: Drinks 4-5 cups a day. May use it in the late afternoon/evening.   Opioids: No   Narcan Kit current: No  Other substances: none    Medical Review of Systems:   Lightheadedness/orthostasis: None  Headaches: Migraine headaches. Also reports runs in her family. Gets 3-5 episodes/month. Started after concussion in HS when playing hockey. LOC for ~5 seconds once. Never imaging or needing to go to the hospital. No seizure hx.  GI: none  Sexual health concerns: Denies being sexually activive     A comprehensive review of systems was performed and is negative other than noted above.    Contraception: None     Mental Status Exam     Alertness: alert  and oriented  Appearance: well groomed  Behavior/Demeanor: cooperative, pleasant, and calm, with good  eye contact  "  Speech: regular rate and rhythm  Language: intact and no problems  Psychomotor: normal or unremarkable  Mood: \"feeling well\"   Affect: full range; congruent to: mood- yes, content- yes  Thought Process/Associations: unremarkable  Thought Content:  Reports none;  Denies suicidal & violent ideation and delusions, delusions , obsessions , phobia , magical thinking, over-valued ideas, and paranoid ideation  Perception:  Reports none;  Denies auditory hallucinations, visual hallucinations, visual distortion seen as shadows , depersonalization, and derealization  Insight: good  Judgment: good  Cognition: does  appear grossly intact; formal cognitive testing was not done  Gait and Station: N/A (telehealth)     Social History                                 pt reported     Financial/employment: 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   Living situation/family: mother (Radha), father and younger sister. Gets along well with family members. Also has older half-sister who she is close to.   Children: No  Social/spiritual support: See above for current.  Education: Completed HS, Completed P[employer] at [employer]. Reports getting \"decent\" grades, in good academic standing. No history of 504 or IEP.   Early history:   Raised by: biological parents   Legal:  Denies      Family Mental Health History                                 pt reported     Mother: MDD, anxiety  Dad: Seasonal depression, anxiety.   Denies hx of bipolar disorder or psychotic disorders   Paternal  grandparent some \"heart issues.      Past Psychiatric History     Self injurious behavior [method, most recent]: No  Suicide attempt [#, most recent, method]: No  Suicidal ideation hx [passive, active]: No    Violence/Aggression Hx:No   Psychosis Hx: No   Eating Disorder Hx: No  Trauma hx: No    Psych Hosp [#, most recent]: No   Commitment: No   TMS/ECT: No   Outpatient Programs [Day treatment, DBT, eating disorder tx, etc]: No    SUBSTANCE " USE HISTORY   Past Use: No  Treatment [#, most recent]: No   Medical Consequences: No   Legal Consequences: No      Past Psych Med Trials        Medication Max Dose (mg) Dates / Duration Helpful? DC Reason / Adverse Effects?   Sertraline   For 1 month Feb 2020  N caused GI upset, discontinued    Fluoxetine  Up to 40 mg Mar - Oct 2020  Partially  caused nausea, only partially helpful for anxiety, emotional blunting motional blunting, and pt was tending less to her needs (messy room, forgot to feed gecko and it passed away). Depression worse with increased dose   Venlafaxine 150 mg -->187.5 mg October 2021-present   Increase to 187.5 mg Oct 2024 Y              Vitals   There were no vitals taken for this visit.  Pulse Readings from Last 3 Encounters:   02/13/24 83   09/15/23 97   05/26/23 101     Wt Readings from Last 3 Encounters:   11/07/24 52.2 kg (115 lb)   10/10/24 52.2 kg (115 lb)   02/13/24 53.7 kg (118 lb 7 oz)     BP Readings from Last 3 Encounters:   02/13/24 128/86   09/15/23 118/77   05/26/23 137/88        Medical History     ALLERGIES: Patient has no known allergies.    Patient Active Problem List   Diagnosis    Acne vulgaris    Generalized anxiety disorder    Major depressive disorder, recurrent, moderate (H)    Encounter for preventive care    Spondylolysis    Migraine with aura and with status migrainosus, not intractable    Medication monitoring encounter    Kyphosis (acquired) (postural)    Chronic right shoulder pain    Preop general physical exam        Medications     Current Outpatient Medications   Medication Sig Dispense Refill    adapalene (DIFFERIN) 0.1 % external cream [ADAPALENE (DIFFERIN) 0.1 % CREAM] Apply to affected area nightly 45 g 1    busPIRone (BUSPAR) 5 MG tablet Take 1 tablet (5 mg) by mouth 2 times daily. 60 tablet 1    cholecalciferol, vitamin D3, (VITAMIN D3 ORAL) [CHOLECALCIFEROL, VITAMIN D3, (VITAMIN D3 ORAL)] Take by mouth.      hydrOXYzine luis manuel (VISTARIL) 25 MG capsule Take  "1-2 capsules (25-50 mg) by mouth 2 times daily as needed for anxiety 120 capsule 3    SUMAtriptan (IMITREX) 50 MG tablet Take 1 tablet (50 mg) by mouth at onset of headache for migraine May repeat in 2 hours. Max 4 tablets/24 hours. 12 tablet 3    venlafaxine (EFFEXOR XR) 150 MG 24 hr capsule Take 1 capsule (150 mg) by mouth daily. 30 capsule 1    venlafaxine (EFFEXOR XR) 37.5 MG 24 hr capsule 1 cap by mouth daily (to be taken together with venlafaxine  mg for a total daily dose of 187.5 mg) 30 capsule 1        Labs and Data         1/10/2024     8:15 AM 1/23/2024     6:42 AM 10/10/2024    10:53 AM   PROMIS-10 Total Score w/o Sub Scores   PROMIS TOTAL - SUBSCORES 25 21 25         1/10/2024     8:02 AM 1/10/2024     8:15 AM   CAGE-AID Total Score   Total Score 0 0   Total Score MyChart  0 (A total score of 2 or greater is considered clinically significant)         1/10/2024     8:14 AM 1/23/2024     6:41 AM 10/10/2024    10:51 AM   PHQ-9 SCORE   PHQ-9 Total Score MyChart 9 (Mild depression) 10 (Moderate depression) 7 (Mild depression)   PHQ-9 Total Score 9 10 7         5/26/2023     3:05 PM 1/10/2024     8:00 AM 1/10/2024     8:15 AM   NILS-7 SCORE   Total Score 10 (moderate anxiety)  9 (mild anxiety)   Total Score 10 9 9       Liver/Kidney Function, TSH Metabolic Blood counts   Recent Labs   Lab Test 09/15/23  1405 06/01/23  1051   AST 24 20   ALT 16 16   ALKPHOS 54 61   CR 0.70 0.78     Recent Labs   Lab Test 03/19/21  1530   TSH 1.31    Recent Labs   Lab Test 06/01/23  1051   CHOL 179*   TRIG 64   LDL 97   HDL 69     No lab results found.  Recent Labs   Lab Test 09/15/23  1405   GLC 89    Recent Labs   Lab Test 02/13/24  1134   WBC 5.7   HGB 14.5   HCT 42.7   MCV 86             October/2021 ECG  QTc = 463 ms, followed by 12-day Holer study for \"irregular heartbeat\" with normal results, per records.    Psychiatry Individual Psychotherapy Note   Psychotherapy start time - 2:15  Psychotherapy end time - " 2:45  Date treatment plan last reviewed with patient - 10/10/24  Subjective: This supportive psychotherapy session addressed issues related to goals of therapy and current psychosocial stressors. Patient's reaction: Pre-contemplation in the context of mental status appropriate for ambulatory setting.    Interactive complexity indicated? No  Plan: RTC in timeframe noted above  Psychotherapy services during this visit included myself and the patient.   Treatment Plan      SYMPTOMS; PROBLEMS   MEASURABLE GOALS;    FUNCTIONAL IMPROVEMENT / GAINS INTERVENTIONS DISCHARGE CRITERIA   Anxiety, social anxiety, and panic attacks   Learn coping and assertiveness skills, understand triggers for anxiety, learning emotional regulation skills Supportive / psychodynamic marked symptom improvement   anxiety and stress Stress management, skills of balancing work and relaxation Supportive / psychodynamic marked symptom improvement   [id number]}     Billing statement based on time: On the date of service, I spent a total of xx minutes reviewing the EMR, meeting with the patient, coordinating care, and documenting in the EMR.  PROVIDER: Lois Barragan MD

## 2025-02-27 ENCOUNTER — VIRTUAL VISIT (OUTPATIENT)
Dept: PSYCHIATRY | Facility: CLINIC | Age: 20
End: 2025-02-27
Attending: PSYCHIATRY & NEUROLOGY
Payer: COMMERCIAL

## 2025-02-27 DIAGNOSIS — F33.42 MAJOR DEPRESSIVE DISORDER, RECURRENT, IN FULL REMISSION: Primary | ICD-10-CM

## 2025-02-27 DIAGNOSIS — F41.1 GENERALIZED ANXIETY DISORDER: ICD-10-CM

## 2025-02-27 NOTE — PATIENT INSTRUCTIONS
**For crisis resources, please see the information at the end of this document**   Patient Education    Thank you for coming to the [facility] [place] [facility].     Lab Testing:  If you had lab testing today and your results are reassuring or normal they will be mailed to you or sent through [employer] within 7 days. If the lab tests need quick action we will call you with the results. The phone number we will call with results is # [phone]. If this is not the best number please call our clinic and change the number.     Medication Refills:  If you need any refills please call your pharmacy and they will contact us. Our fax number for refills is [phone].   Three business days of notice are needed for general medication refill requests.   Five business days of notice are needed for controlled substance refill requests.   If you need to change to a different pharmacy, please contact the new pharmacy directly. The new pharmacy will help you get your medications transferred.     Contact Us:  Please call [phone] during business hours (8-5:00 M-F).   If you have medication related questions after clinic hours, or on the weekend, please call [phone].     Financial Assistance [phone]   Medical Records [phone]       MENTAL HEALTH CRISIS RESOURCES:  For a emergency help, please call 911 or go to the nearest Emergency Department.     Emergency Walk-In Options:   EmPATH Unit @ [facility] ([place]): [phone] - Specialized mental health emergency area designed to be calming  [facility] ([place]): [phone]  [facility] ([place]): [phone]  [facility]): [phone]    [facility] Crisis Information:   [place]: [phone]  [place]: [phone]  [place] ([place]ICH[place]) - Adult: [phone]     Child: [phone]  [place] - Adult: [phone]     Child: [phone]  [place]:  [phone]  List of all [facility] resources:   https://[place].gov/dhs/people-we-serve/adults/health-care/mental-health/resources/crisis-contacts.jsp    National Crisis Information:   Crisis Text Line: Text  [place]  to 419209  Suicide & Crisis Lifeline: 988  National Suicide Prevention Lifeline: 0-973-271-TALK (1-995.251.8980)       For online chat options, visit https://suicidepreventionlifeline.org/chat/  Poison Control Center: 4-896-235-5966  Trans Lifeline: 5-959-175-0880 - Hotline for transgender people of all ages  The Kt Project: 5-883-131-0349 - Hotline for LGBT youth     For Non-Emergency Support:   Fast Tracker: Mental Health & Substance Use Disorder Resources -   https://www.Peeriusck[employer]ax8n.org/

## 2025-02-27 NOTE — PROGRESS NOTES
Answers submitted by the patient for this visit:  Patient Health Questionnaire (Submitted on 12/5/2024)  If you checked off any problems, how difficult have these problems made it for you to do your work, take care of things at home, or get along with other people?: Somewhat difficult  PHQ9 TOTAL SCORE: 9    Virtual Visit Details  Feb 27, 2025  Type of service:  Video Visit     Start time: 13:30 pm  End time: 14:30 pm  Originating Location (pt. Location): Home  Distant Location (provider location):  On-site  Platform used for Video Visit: [facility]  TRANSFER of CARE DIAGNOSTIC ASSESSMENT     CARE TEAM:    PCP- Yen Jauregui  Therapist- None    Luz is a 20 year old who uses the pronouns she, her, hers.   Identifies as a adhikari female     Diagnoses     Major depressive disorder, recurrent, in full remission with seasonal pattern    NILS      Assessment     Luz Webber is a 21 yo with past psychiatric diagnoses of depression and anxiety especially during her school years. 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    Today, overall, Veronique reports her mood and anxiety has been stable and wants to maintain current mental health status. She had rare anxiety attack once a month and hydroxyzine has been helpful. She has not been taking Buspar and was okay with stopping that medication. Venlafaxine has been helpful without noticeable side effect and she wanted to continue current dose of venlafaxine. No safety concerns including SI/HI. Recently she noticed her problem with work-life balance that has been preventing her from having more pleasure in life outside of work. She started making plans to modify that and made small changes to be more relaxed. She is not interested in  "finding therapist but was interested in continuing this discussion in the future session. Has been having chronic fatigue in the context of irregular, broken sleep structure though she states it has been \"bit improved\" in falling into sleep. She does not want to change the sleep schedule now since she thinks they are optimized for current work schedule but would like to consider change after she find a way to be more relaxed with decreased workload in her workplace.    Future Considerations:  -Consider optimizing Venlafaxine to 225 if depressive symptoms were to re-emerge.  -Gabapentin or flexeril  -Hydroxyzine has been helpful for anxiety and if this was to worsen, consider using propranolol, gabapentin or Buspar or flexeril   -Planning to find a psychiatrist for her in the community if she remains stable in the next visit.    Psychotropic Drug Interactions:  [PSYCHCLINICDDI]  ADDITIVE SEROTONERGIC: Sumatriptan, Venlafaxine  Management: routine monitoring and patient aware of risks     MNPMP was not checked today: not using controlled substances    Risk Statements:   Treatment Risk- Risks, benefits, alternatives and potential adverse effects have been discussed and are understood.   Safety Risk-Luz did not appear to be an imminent safety risk to self or others.     Plan     1) Medications:   - Continue Effexor  mg at bedtime  - Discontinue Buspar 5 mg po BID   - Continue hydroxyzine to 25-50 mg PO BID PRN, anxiety  - Melatonin 1 mg po at bedtime (also discussed a future option of gabapentin or muscle relaxant due to hx of pain at night)      Other:   -OTC. Vitamin D supplementation (50 mcg daily) for Vitamin D insufficiency   -Sumatriptan 50 mg PO may repeat q 2 hrs, max of 4 tabs in 24 hrs. Prescribed by PCP--Patient advised to re-contact her PCP to resume or referral for neurology/migraine care     2) Psychotherapy:   Open to a referral for therapy which was placed today    3) Next due:  Labs- Routine " "monitoring is not indicated for current psychotropic medication regimen   EKG- Routine monitoring is not indicated for current psychotropic medication regimen   Rating scales-  PHQ9, GAD7     4) Referrals:   None    5) LABs/Other:   -No labs today, recommend reconnecting with PCP, may repeat TSH if not done there  -Recommended continue working with PCP for migraine management/ref to Neuro.     6) Follow-up: Return to clinic in 4 weeks       Pertinent Background                                                   [most recent eval 01/09/24]     Luz is a reports that she has experienced anxiety her entire life, but symptoms noticeably worsened around middle school, at a time where she had some changes in friend groups. She first sought therapy in October 2020 at age 15, where she was diagnosed with depression and anxiety.  Has tried a couple of selective serotonin reuptake inhibitors (poorly tolerated, see table below), before starting on SNRI, Venlafaxine, starting in 2021-currently.  Initially treated by PCP but later seen at the [facility] clinic since October 2021 to June 2023. Transferred to PCP (uncomfortable prescribing antidepressant) and then adult  here in Jan 2024.  Individual Therapy: from 2021 to 2021 (CBT).  Med Hx: Struggles with some chronic back and shoulder pain as well as migraines.    Pertinent items include: [N/A] no prior hospitalizations, no IOP/PHP, no SI/SA hx, no psychosis.      Hx of father having heart issues and having a serious MVA (traumatic event for the patient); hx of perfectionism and excessive worry about other people's feelings.  Hx of \"growing up too fast\"/ having a lot of responsibility at younger age.      Subjective   Since last visit:  - Overall has been doing well, mood has been \"fine\" and has been having anxiety from \"good change\" including hiring more people. Having occasional anxiety attack with itching sensation and sweating (~once a month). Hydroxyzine was helpful in that " "situation.  - Current biggest goal is the maintenance of current status of mental health  - Has been very busy at work, working almost 7 days a week. There has been anxiety of not being productive and when she is not working, she does not know what other things to do. But started working after graduating highschool improved her mental health a lot and she likes her current work. She came up with chain of thought including anxiety of not being productive - working too much - does not have a time to socialize and thought of people might think she is not a fun person to socialize - she does not enjoy anything other than work. She is now trying to find a balance and trying to be more relaxed. As a first step, she hired three people recently. She states she does not want to see the therapist to facilitate this but showed interest in further discussing this option in the future session.  - Sleep has been \"bit improved\", especially better at falling into sleep. She has been having hard time sleeping for a long period time probably related to her pattern of sleeping. She wakes up around 5am  and works until 2-3pm, and take a nap for 3-6 hrs, wake up and possibly get some dinner, and then goes back to bed around 11pm or later. She has been chronically feeling tired. When asked if she wants to change current sleep pattern, she repots she is not too concerned about sleep now but can consider that later when she can have less work to do.  - Eating: appetite is good, but eating has not been regular due to being busy at work  - Denied SI/HI  - Venlafaxine has been helpful for decreasing anxiety. No side effect reported. Wants to continue as it is.  - She has not tried buspar and agreed with stopping that.    Pertinent Social History:  Financial/occupational: Ownes a coffee shop   Living situation:  Lives at parents home.  Social/spiritual support: sisters, mom, some friends.   Feels safe at home: Yes    Pertinent Substance Use: " "  Alcohol: Yes: Casual probably 1 night a week, just small beer  Cannabis: Yes: 1 THC gummy (5 mg) once a month, given by her dad. Denies problematic use, cravings, noticing impact in mood or anxiety or other concerns currently. Continue to monitor closely/offer resources as needed.   Tobacco: No  Caffeine:  Yes: Drinks 4-5 cups a day. May use it in the late afternoon/evening.   Opioids: No   Narcan Kit current: No  Other substances: none      Continue to explore ADHD family risk (from previous visit with Dr. Barragan 12/05/24):  As a child:  -Math was hard; always anxious at school, asking for help was hard.  Did not want others to know that she was struggling (math and physics, spelling/grammar, Citizen of [place]).  Some daydreaming or feeling distracted.  Worried about \"stomach growling\", anxious about public speaking, talking to teacher, avoided social parties/anxious about it,   -able to get homework in elementary school; if interested in class  -more trouble with math or Citizen of [place] also very anxious there.  A lot of worry about those classes. A lot of anxiety about school and test scores (embarrassed about grades).  7th and 8th grade \"C\" in math, F at some tests.  GPA=3.4.    -Pushed herself in first and second year of HS, took AP but \"went on line because of COVID\" and never went back.  She reports \"I stopped caring\" in carrie and senior year of HS.  Was working at the coffee shop and \"found my passion\". Glad to not be in collage.  Was anticipating needing to make money for collage. Now parents are doing better financially and per patient spoiling her sister/parenting her very differently than the patient was treated.    -Feels more financially able and more mature than others in her family.  Patient feels sister gets to have new hockey gear while the patient had to use the same gear for many years.   -Loved being on the go, working out / play hockey, exercising, being busy all day in HS.  Also liked being connected to " family via hockey.  All cousins play hockey.  Feeling disconnected from family now because age difference and not playing hockey as much.  Feeling like relationships with siblings is complicated and she is anxious about relationships with her sisters.          Meds: On Effexor  mg qd  -Buspar 5 mg bid for anxiety to help with anxiety--has not tired it yet        Recent Psych Symptoms:   Depression:  some depression 2 weeks before her menses  Elevated:  none  Psychosis:  none  Anxiety:  excessive worry at times about relationships  Trauma Related:  None. Denies hx of trauma.  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  Living situation (partner, children, pets, etc):  Lives at parents home. Has an older (half-sister, 24 yo, finishing grad school in psychology, sex therapist) and a younger sister (15 yo). Lives with bio mom and dad and younger sisters.   Social/spiritual support: sisters, mom, some friends.   Feels safe at home: Yes    Pertinent Substance Use:   Alcohol: Yes: Casual probably 1 night a week, just small beer  Cannabis: Yes: 1 THC gummy (5 mg) once a month, given by her dad. Denies problematic use, cravings, noticing impact in mood or anxiety or other concerns currently. Continue to monitor closely/offer resources as needed.   Tobacco: No  Caffeine:  Yes: Drinks 4-5 cups a day. May use it in the late afternoon/evening.   Opioids: No   Narcan Kit current: No  Other substances: none    Medical Review of Systems:   Lightheadedness/orthostasis: None  Headaches: Migraine headaches. Also reports runs in her family. Gets 3-5 episodes/month. Started after concussion in HS when playing hockey. LOC for ~5 seconds once. Never imaging or needing to go to the hospital. No seizure hx.  GI: none  Sexual health concerns: Denies being sexually activive     A comprehensive review of systems was performed and is negative  "other than noted above.    Contraception: None     Mental Status Exam     Alertness: alert  and oriented  Appearance: well groomed  Behavior/Demeanor: cooperative, pleasant, and calm, with good  eye contact   Speech: regular rate and rhythm  Language: intact and no problems  Psychomotor: normal or unremarkable  Mood: \"fine\"   Affect: full range; congruent to: mood- yes, content- yes  Thought Process/Associations: unremarkable  Thought Content:  Reports none;  Denies suicidal & violent ideation and delusions, delusions , obsessions , phobia , magical thinking, over-valued ideas, and paranoid ideation  Perception:  Reports none;  Denies auditory hallucinations, visual hallucinations, visual distortion seen as shadows , depersonalization, and derealization  Insight: good  Judgment: good  Cognition: does  appear grossly intact; formal cognitive testing was not done  Gait and Station: N/A (Shake)     Social History                                 pt reported     Financial/employment: 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   Living situation/family: mother (Radha), father and younger sister. Gets along well with family members. Also has older half-sister who she is close to.   Children: No  Social/spiritual support: See above for current.  Education: Completed HS, Completed PSEO at [employer]. Reports getting \"decent\" grades, in good academic standing. No history of 504 or IEP.   Early history:   Raised by: biological parents   Legal:  Denies      Family Mental Health History                                 pt reported     Mother: MDD, anxiety  Dad: Seasonal depression, anxiety.   Denies hx of bipolar disorder or psychotic disorders   Paternal  grandparent some \"heart issues.      Past Psychiatric History     Self injurious behavior [method, most recent]: No  Suicide attempt [#, most recent, method]: No  Suicidal ideation hx [passive, active]: No    Violence/Aggression Hx:No "   Psychosis Hx: No   Eating Disorder Hx: No  Trauma hx: No    Psych Hosp [#, most recent]: No   Commitment: No   TMS/ECT: No   Outpatient Programs [Day treatment, DBT, eating disorder tx, etc]: No    SUBSTANCE USE HISTORY   Past Use: No  Treatment [#, most recent]: No   Medical Consequences: No   Legal Consequences: No      Past Psych Med Trials        Medication Max Dose (mg) Dates / Duration Helpful? DC Reason / Adverse Effects?   Sertraline   For 1 month Feb 2020  N caused GI upset, discontinued    Fluoxetine  Up to 40 mg Mar - Oct 2020  Partially  caused nausea, only partially helpful for anxiety, emotional blunting motional blunting, and pt was tending less to her needs (messy room, forgot to feed gecko and it passed away). Depression worse with increased dose   Venlafaxine 150 mg -->187.5 mg October 2021-present   Increase to 187.5 mg Oct 2024 Y              Vitals   There were no vitals taken for this visit.  Pulse Readings from Last 3 Encounters:   02/13/24 83   09/15/23 97   05/26/23 101     Wt Readings from Last 3 Encounters:   12/05/24 52.2 kg (115 lb)   11/07/24 52.2 kg (115 lb)   10/10/24 52.2 kg (115 lb)     BP Readings from Last 3 Encounters:   02/13/24 128/86   09/15/23 118/77   05/26/23 137/88        Medical History     ALLERGIES: Patient has no known allergies.    Patient Active Problem List   Diagnosis    Acne vulgaris    Generalized anxiety disorder    Major depressive disorder, recurrent, moderate (H)    Encounter for preventive care    Spondylolysis    Migraine with aura and with status migrainosus, not intractable    Medication monitoring encounter    Kyphosis (acquired) (postural)    Chronic right shoulder pain    Preop general physical exam        Medications     Current Outpatient Medications   Medication Sig Dispense Refill    adapalene (DIFFERIN) 0.1 % external cream [ADAPALENE (DIFFERIN) 0.1 % CREAM] Apply to affected area nightly 45 g 1    busPIRone (BUSPAR) 5 MG tablet Take 1 tablet (5  mg) by mouth 2 times daily. 60 tablet 1    cholecalciferol, vitamin D3, (VITAMIN D3 ORAL) [CHOLECALCIFEROL, VITAMIN D3, (VITAMIN D3 ORAL)] Take by mouth.      hydrOXYzine luis manuel (VISTARIL) 25 MG capsule Take 1-2 capsules (25-50 mg) by mouth 2 times daily as needed for anxiety 120 capsule 3    SUMAtriptan (IMITREX) 50 MG tablet Take 1 tablet (50 mg) by mouth at onset of headache for migraine May repeat in 2 hours. Max 4 tablets/24 hours. 12 tablet 3    venlafaxine (EFFEXOR XR) 150 MG 24 hr capsule Take 1 capsule (150 mg) by mouth daily. 30 capsule 0    venlafaxine (EFFEXOR XR) 37.5 MG 24 hr capsule 1 cap by mouth daily (to be taken together with venlafaxine  mg for a total daily dose of 187.5 mg) 30 capsule 1        Labs and Data         1/10/2024     8:15 AM 1/23/2024     6:42 AM 10/10/2024    10:53 AM   PROMIS-10 Total Score w/o Sub Scores   PROMIS TOTAL - SUBSCORES 25 21 25         1/10/2024     8:02 AM 1/10/2024     8:15 AM   CAGE-AID Total Score   Total Score 0 0   Total Score MyChart  0 (A total score of 2 or greater is considered clinically significant)         1/23/2024     6:41 AM 10/10/2024    10:51 AM 12/5/2024     1:50 PM   PHQ-9 SCORE   PHQ-9 Total Score MyChart 10 (Moderate depression) 7 (Mild depression) 9 (Mild depression)   PHQ-9 Total Score 10 7 9        Patient-reported         5/26/2023     3:05 PM 1/10/2024     8:00 AM 1/10/2024     8:15 AM   NILS-7 SCORE   Total Score 10 (moderate anxiety)  9 (mild anxiety)   Total Score 10 9 9       Liver/Kidney Function, TSH Metabolic Blood counts   Recent Labs   Lab Test 09/15/23  1405 06/01/23  1051   AST 24 20   ALT 16 16   ALKPHOS 54 61   CR 0.70 0.78     Recent Labs   Lab Test 03/19/21  1530   TSH 1.31    Recent Labs   Lab Test 06/01/23  1051   CHOL 179*   TRIG 64   LDL 97   HDL 69     No lab results found.  Recent Labs   Lab Test 09/15/23  1405   GLC 89    Recent Labs   Lab Test 02/13/24  1134   WBC 5.7   HGB 14.5   HCT 42.7   MCV 86        "      October/2021 ECG  QTc = 463 ms, followed by 12-day Holer study for \"irregular heartbeat\" with normal results, per records.    PROVIDER: Isi Maldonado MD PhD  This patient was staffed by Dr. Radford who agrees with my assessment and plan      Answers submitted by the patient for this visit:  Patient Health Questionnaire (Submitted on 2/27/2025)  If you checked off any problems, how difficult have these problems made it for you to do your work, take care of things at home, or get along with other people?: Somewhat difficult  PHQ9 TOTAL SCORE: 9    "

## 2025-04-03 ENCOUNTER — VIRTUAL VISIT (OUTPATIENT)
Dept: PSYCHIATRY | Facility: CLINIC | Age: 20
End: 2025-04-03
Attending: PSYCHIATRY & NEUROLOGY
Payer: COMMERCIAL

## 2025-04-03 VITALS — HEIGHT: 63 IN | BODY MASS INDEX: 20.37 KG/M2

## 2025-04-03 DIAGNOSIS — F33.42 MAJOR DEPRESSIVE DISORDER, RECURRENT, IN FULL REMISSION: ICD-10-CM

## 2025-04-03 DIAGNOSIS — F41.1 GAD (GENERALIZED ANXIETY DISORDER): Primary | ICD-10-CM

## 2025-04-03 ASSESSMENT — PATIENT HEALTH QUESTIONNAIRE - PHQ9
SUM OF ALL RESPONSES TO PHQ QUESTIONS 1-9: 9
10. IF YOU CHECKED OFF ANY PROBLEMS, HOW DIFFICULT HAVE THESE PROBLEMS MADE IT FOR YOU TO DO YOUR WORK, TAKE CARE OF THINGS AT HOME, OR GET ALONG WITH OTHER PEOPLE: SOMEWHAT DIFFICULT
SUM OF ALL RESPONSES TO PHQ QUESTIONS 1-9: 9

## 2025-04-03 ASSESSMENT — PAIN SCALES - GENERAL: PAINLEVEL_OUTOF10: MODERATE PAIN (4)

## 2025-04-03 NOTE — PROGRESS NOTES
Virtual Visit Details  Type of service:  Video Visit      Start time: 13:30 pm  End time: 13:55 pm  Originating Location (pt. Location): Home  Distant Location (provider location):  On-site  Platform used for Video Visit: [employer]  Answers submitted by the patient for this visit:  Patient Health Questionnaire (Submitted on 4/3/2025)  If you checked off any problems, how difficult have these problems made it for you to do your work, take care of things at home, or get along with other people?: Somewhat difficult  PHQ9 TOTAL SCORE: 9

## 2025-04-03 NOTE — PROGRESS NOTES
"Virtual Visit Details  Type of service:  Video Visit     Start time: 13:30 pm  End time: 13:55 pm  Originating Location (pt. Location): Home  Distant Location (provider location):  On-site  Platform used for Video Visit: [facility]  Psychiatry Progress Note     CARE TEAM:    PCP- Yen Jauregui  Therapist- None    Luz is a 20 year old who uses the pronouns she, her, hers.   Identifies as a adhikari female     Diagnoses     Major depressive disorder, recurrent, in full remission with seasonal pattern    NILS      Assessment     Veronique Webber is a 21 yo with past psychiatric diagnoses of depression and anxiety especially during her school years. 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    Today, overall, Veronique reports her mood has been \"good\" and might have been experiencing more anxiety in the context of increased workload which she thinks it is temporary and is a part of the long-term plan to decrease her workload. Sleep has been \"okay\" and states she is still motivated with her work and life though feeling a bit tired. She states she is in a good place in terms of mental health and wants to continue current medication regimen without change. She is not interested in finding therapist but was interested in continuing this discussion in the future session.     No safety concerns including SI/HI today.    Future Considerations:  -Consider optimizing Venlafaxine to 225 if depressive symptoms were to re-emerge.  -Gabapentin or flexeril  -Hydroxyzine has been helpful for anxiety and if this was to worsen, consider using propranolol, gabapentin or Buspar or flexeril   -Planning to find a psychiatrist for her in the community if she remains stable in the next " visit.    Psychotropic Drug Interactions:  [PSYCHCLINICDDI]  ADDITIVE SEROTONERGIC: Sumatriptan, Venlafaxine  Management: routine monitoring and patient aware of risks     MNPMP was not checked today: not using controlled substances    Risk Statements:   Treatment Risk- Risks, benefits, alternatives and potential adverse effects have been discussed and are understood.   Safety Risk-Luz did not appear to be an imminent safety risk to self or others.      Plan     1) Medications:   - Continue Effexor  mg at bedtime (per BINTA Barragan MD's 11/7/2024 note, Effexor XR dose was decreased back to 150mg/day)  - Continue hydroxyzine to 25-50 mg PO BID PRN, anxiety  - Melatonin 1 mg po at bedtime (also discussed a future option of gabapentin or muscle relaxant due to hx of pain at night)      Other:   -OTC. Vitamin D supplementation (50 mcg daily) for Vitamin D insufficiency   -Sumatriptan 50 mg PO may repeat q 2 hrs, max of 4 tabs in 24 hrs. Prescribed by PCP--Patient advised to re-contact her PCP to resume or referral for neurology/migraine care     2) Psychotherapy:   On 2/27/2025, Veronique was open to a referral for therapy which was placed on that day, though subsequently Veronique's decided against doing therapy for now.    3) Next due:  Labs- Routine monitoring is not indicated for current psychotropic medication regimen   EKG- Routine monitoring is not indicated for current psychotropic medication regimen   Rating scales-  PHQ9, GAD7     4) Referrals:   None    5) LABs/Other:   -No labs today, recommend reconnecting with PCP, may repeat TSH if not done there  -Recommended continue working with PCP for migraine management/ref to Neuro.     6) Follow-up: Return to clinic in 9 weeks       Pertinent Background                                                   [most recent eval 01/09/24]     Luz is a reports that she has experienced anxiety her entire life, but symptoms noticeably worsened around middle school,  "at a time where she had some changes in friend groups. She first sought therapy in October 2020 at age 15, where she was diagnosed with depression and anxiety.  Has tried a couple of selective serotonin reuptake inhibitors (poorly tolerated, see table below), before starting on SNRI, Venlafaxine, starting in 2021-currently.  Initially treated by PCP but later seen at the [facility] clinic since October 2021 to June 2023. Transferred to PCP (uncomfortable prescribing antidepressant) and then adult  here in Jan 2024.  Individual Therapy: from 2021 to 2021 (CBT).  Med Hx: Struggles with some chronic back and shoulder pain as well as migraines.    Pertinent items include: [N/A] no prior hospitalizations, no IOP/PHP, no SI/SA hx, no psychosis.      Hx of father having heart issues and having a serious MVA (traumatic event for the patient); hx of perfectionism and excessive worry about other people's feelings.  Hx of \"growing up too fast\"/ having a lot of responsibility at younger age.      Subjective   Since last visit:  - Doing \"good\", but with many changes (hiring more people and training them) made her more anxious and exhausting. But not feel too bad about this since this is necessary change for the better future, she is working on long-term plan to decrease her work by hiring more people.  - Trying hard to make some boundaries between work and life outside work, trying not to carry stressors from work to home;  - Sleep: it's okay, pretty good.  - Eating: \"fine\", same, sometimes forget   - Energy & Motivation: pretty tired but very motivated. But think this is situational and manageable.  - Hobbies: likes watching hockey (and sometimes play), likes playing with dog  - Not utilizing hydroxyzine that much   - No SI/HI  - Don't want to make any medication change for today, think she is in a good place.      Pertinent Social History:  Financial/occupational: Owns a coffee shop   Living situation:  Lives at parents " "home.  Social/spiritual support: sisters, mom, some friends.   Feels safe at home: Yes    Pertinent Substance Use:   Alcohol: Yes: Casual probably 1 night a week, just small beer  Cannabis: Yes: 1 THC gummy (5 mg) once a month, given by her dad. Denies problematic use, cravings, noticing impact in mood or anxiety or other concerns currently. Continue to monitor closely/offer resources as needed.   Tobacco: No  Caffeine:  Yes: Drinks 4-5 cups a day. May use it in the late afternoon/evening.   Opioids: No   Narcan Kit current: No  Other substances: none    Continue to explore ADHD family risk (from previous visit with Dr. Barragan 12/05/24):  As a child:  -Math was hard; always anxious at school, asking for help was hard.  Did not want others to know that she was struggling (math and physics, spelling/grammar, Lithuanian).  Some daydreaming or feeling distracted.  Worried about \"stomach growling\", anxious about public speaking, talking to teacher, avoided social parties/anxious about it,   -able to get homework in elementary school; if interested in class  -more trouble with math or Lithuanian also very anxious there.  A lot of worry about those classes. A lot of anxiety about school and test scores (embarrassed about grades).  7th and 8th grade \"C\" in math, F at some tests.  GPA=3.4.    -Pushed herself in first and second year of HS, took AP but \"went on line because of COVID\" and never went back.  She reports \"I stopped caring\" in carrie and senior year of HS.  Was working at the coffee shop and \"found my passion\". Glad to not be in collage.  Was anticipating needing to make money for collage. Now parents are doing better financially and per patient spoiling her sister/parenting her very differently than the patient was treated.    -Feels more financially able and more mature than others in her family.  Patient feels sister gets to have new hockey gear while the patient had to use the same gear for many years.   -Loved " "being on the go, working out / play hockey, exercising, being busy all day in HS.  Also liked being connected to family via hockey.  All cousins play hockey.  Feeling disconnected from family now because age difference and not playing hockey as much.  Feeling like relationships with siblings is complicated and she is anxious about relationships with her sisters.       Mental Status Exam     Alertness: alert  and oriented  Appearance: well groomed  Behavior/Demeanor: cooperative, pleasant, and calm, with good  eye contact   Speech: regular rate and rhythm  Language: intact and no problems  Psychomotor: normal or unremarkable  Mood: \"fine\"   Affect: full range; congruent to: mood- yes, content- yes  Thought Process/Associations: unremarkable  Thought Content:  Reports none;  Denies suicidal & violent ideation and delusions, delusions , obsessions , phobia , magical thinking, over-valued ideas, and paranoid ideation  Perception:  Reports none;  Denies auditory hallucinations, visual hallucinations, visual distortion seen as shadows , depersonalization, and derealization  Insight: good  Judgment: good  Cognition: does  appear grossly intact; formal cognitive testing was not done  Gait and Station: N/A ([facility])     Social History                                 pt reported     Financial/employment: 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   Living situation/family: mother (Radha), father and younger sister. Gets along well with family members. Also has older half-sister who she is close to.   Children: No  Social/spiritual support: See above for current.  Education: Completed HS, Completed PSEO at [employer]. Reports getting \"decent\" grades, in good academic standing. No history of 504 or IEP.   Early history:   Raised by: biological parents   Legal:  Denies      Family Mental Health History                                 pt reported     Mother: MDD, anxiety  Dad: Seasonal depression, " "anxiety.   Denies hx of bipolar disorder or psychotic disorders   Paternal  grandparent some \"heart issues.\"      Past Psychiatric History     Self injurious behavior [method, most recent]: No  Suicide attempt [#, most recent, method]: No  Suicidal ideation hx [passive, active]: No    Violence/Aggression Hx:No   Psychosis Hx: No   Eating Disorder Hx: No  Trauma hx: No    Psych Hosp [#, most recent]: No   Commitment: No   TMS/ECT: No   Outpatient Programs [Day treatment, DBT, eating disorder tx, etc]: No    SUBSTANCE USE HISTORY   Past Use: No  Treatment [#, most recent]: No   Medical Consequences: No   Legal Consequences: No      Past Psych Med Trials        Medication Max Dose (mg) Dates / Duration Helpful? DC Reason / Adverse Effects?   Sertraline   For 1 month Feb 2020  N caused GI upset, discontinued    Fluoxetine  Up to 40 mg Mar - Oct 2020  Partially  caused nausea, only partially helpful for anxiety, emotional blunting motional blunting, and pt was tending less to her needs (messy room, forgot to feed gecko and it passed away). Depression worse with increased dose   Venlafaxine 150 mg -->187.5 mg October 2021-present   Increase to 187.5 mg Oct 2024 Y              Vitals   There were no vitals taken for this visit.  Pulse Readings from Last 3 Encounters:   02/13/24 83   09/15/23 97   05/26/23 101     Wt Readings from Last 3 Encounters:   12/05/24 52.2 kg (115 lb)   11/07/24 52.2 kg (115 lb)   10/10/24 52.2 kg (115 lb)     BP Readings from Last 3 Encounters:   02/13/24 128/86   09/15/23 118/77   05/26/23 137/88        Medical History     ALLERGIES: Patient has no known allergies.    Patient Active Problem List   Diagnosis    Acne vulgaris    Generalized anxiety disorder    Major depressive disorder, recurrent, moderate (H)    Encounter for preventive care    Spondylolysis    Migraine with aura and with status migrainosus, not intractable    Medication monitoring encounter    Kyphosis (acquired) (postural)    " Chronic right shoulder pain    Preop general physical exam        Medications     **Per BINTA Barragan MD's 11/7/2024 note, Effexor XR dose was decreased back to 150mg/day.**    Current Outpatient Medications   Medication Sig Dispense Refill    adapalene (DIFFERIN) 0.1 % external cream [ADAPALENE (DIFFERIN) 0.1 % CREAM] Apply to affected area nightly 45 g 1    cholecalciferol, vitamin D3, (VITAMIN D3 ORAL) [CHOLECALCIFEROL, VITAMIN D3, (VITAMIN D3 ORAL)] Take by mouth.      hydrOXYzine luis manuel (VISTARIL) 25 MG capsule Take 1-2 capsules (25-50 mg) by mouth 2 times daily as needed for anxiety 120 capsule 3    SUMAtriptan (IMITREX) 50 MG tablet Take 1 tablet (50 mg) by mouth at onset of headache for migraine May repeat in 2 hours. Max 4 tablets/24 hours. 12 tablet 3    venlafaxine (EFFEXOR XR) 150 MG 24 hr capsule Take 1 capsule (150 mg) by mouth daily. For more refills,schedule an appointment at [phone] 30 capsule 0    venlafaxine (EFFEXOR XR) 37.5 MG 24 hr capsule 1 cap by mouth daily (to be taken together with venlafaxine  mg for a total daily dose of 187.5 mg) 30 capsule 1        Labs and Data         1/23/2024     6:42 AM 10/10/2024    10:53 AM 2/27/2025     1:22 PM   PROMIS-10 Total Score w/o Sub Scores   PROMIS TOTAL - SUBSCORES 21 25 25        Proxy-reported         1/10/2024     8:02 AM 1/10/2024     8:15 AM   CAGE-AID Total Score   Total Score 0 0   Total Score MyChart  0 (A total score of 2 or greater is considered clinically significant)         10/10/2024    10:51 AM 12/5/2024     1:50 PM 2/27/2025     1:20 PM   PHQ-9 SCORE   PHQ-9 Total Score MyChart 7 (Mild depression) 9 (Mild depression) 9 (Mild depression)   PHQ-9 Total Score 7 9  9        Patient-reported    Proxy-reported         5/26/2023     3:05 PM 1/10/2024     8:00 AM 1/10/2024     8:15 AM   NILS-7 SCORE   Total Score 10 (moderate anxiety)  9 (mild anxiety)   Total Score 10 9 9       Liver/Kidney Function, TSH Metabolic Blood counts   Recent  "Labs   Lab Test 09/15/23  1405 06/01/23  1051   AST 24 20   ALT 16 16   ALKPHOS 54 61   CR 0.70 0.78     Recent Labs   Lab Test 03/19/21  1530   TSH 1.31    Recent Labs   Lab Test 06/01/23  1051   CHOL 179*   TRIG 64   LDL 97   HDL 69     No lab results found.  Recent Labs   Lab Test 09/15/23  1405   GLC 89    Recent Labs   Lab Test 02/13/24  1134   WBC 5.7   HGB 14.5   HCT 42.7   MCV 86             October/2021 ECG  QTc = 463 ms, followed by 12-day Holer study for \"irregular heartbeat\" with normal results, per records.    PROVIDER: Isi Maldonado MD PhD  This patient was not staffed with attending, my supervisor Dr. Radford will review this note     Level of Medical Decision Making:   - At least 1 chronic problem that is not stable  - Engaged in prescription drug management during visit (discussed any medication benefits, side effects, alternatives, etc.)    The longitudinal plan of care for the diagnosis(es)/condition(s) as documented were addressed during this visit. Due to the added complexity in care, I will continue to support Veronique in the subsequent management and with ongoing continuity of care.       Answers submitted by the patient for this visit:  Patient Health Questionnaire (Submitted on 4/3/2025)  If you checked off any problems, how difficult have these problems made it for you to do your work, take care of things at home, or get along with other people?: Somewhat difficult  PHQ9 TOTAL SCORE: 9    "

## 2025-04-03 NOTE — PATIENT INSTRUCTIONS
**For crisis resources, please see the information at the end of this document**   Patient Education    Thank you for coming to the [facility].     Lab Testing:  If you had lab testing today and your results are reassuring or normal they will be mailed to you or sent through [employer] within 7 days. If the lab tests need quick action we will call you with the results. The phone number we will call with results is # [phone]. If this is not the best number please call our clinic and change the number.     Medication Refills:  If you need any refills please call your pharmacy and they will contact us. Our fax number for refills is [phone].   Three business days of notice are needed for general medication refill requests.   Five business days of notice are needed for controlled substance refill requests.   If you need to change to a different pharmacy, please contact the new pharmacy directly. The new pharmacy will help you get your medications transferred.     Contact Us:  Please call [phone] during business hours (8-5:00 M-F).   If you have medication related questions after clinic hours, or on the weekend, please call [phone].     Financial Assistance [phone]   Medical Records [phone]       MENTAL HEALTH CRISIS RESOURCES:  For a emergency help, please call 911 or go to the nearest Emergency Department.     Emergency Walk-In Options:   EmPATH Unit @ [facility] ([place]): [phone] - Specialized mental health emergency area designed to be calming  [facility] ([place]): [phone]  [facility] ([place]): [phone]  [facility]): [phone]    [facility] Crisis Information:   [place]: [phone]  [place]: [phone]  [place] ([place]) - Adult: [phone]     Child: [phone]  [place] - Adult: [phone]     Child: [phone]  [place]:  [phone]  List of all [facility] resources:   https://[place].gov/dhs/people-we-serve/adults/health-care/mental-health/resources/crisis-contacts.jsp    National Crisis Information:   Crisis Text Line: Text  [place]  to 210665  Suicide & Crisis Lifeline: 988  National Suicide Prevention Lifeline: 8-788-741-TALK (1-695.961.7341)       For online chat options, visit https://suicidepreventionlifeline.org/chat/  Poison Control Center: 2-769-272-2038  Trans Lifeline: 5-438-696-2008 - Hotline for transgender people of all ages  The Kt Project: 7-287-481-4595 - Hotline for LGBT youth     For Non-Emergency Support:   Fast Tracker: Mental Health & Substance Use Disorder Resources -   https://www.[employer].org/

## 2025-04-24 ENCOUNTER — TELEPHONE (OUTPATIENT)
Dept: PSYCHIATRY | Facility: CLINIC | Age: 20
End: 2025-04-24

## 2025-04-24 ENCOUNTER — MYC REFILL (OUTPATIENT)
Dept: PSYCHIATRY | Facility: CLINIC | Age: 20
End: 2025-04-24

## 2025-04-24 ENCOUNTER — NURSE TRIAGE (OUTPATIENT)
Dept: NURSING | Facility: CLINIC | Age: 20
End: 2025-04-24

## 2025-04-24 DIAGNOSIS — F41.1 GENERALIZED ANXIETY DISORDER: ICD-10-CM

## 2025-04-24 RX ORDER — VENLAFAXINE HYDROCHLORIDE 150 MG/1
150 CAPSULE, EXTENDED RELEASE ORAL DAILY
Qty: 30 CAPSULE | Refills: 1 | Status: SHIPPED | OUTPATIENT
Start: 2025-04-24

## 2025-04-24 NOTE — TELEPHONE ENCOUNTER
Last seen: 04/03/2025  RTC: 9 Weeks  Any patient initiated cancellations or no shows since last visit? No  Next appt: 06/05/2025  Last filled: 03/28/2025     Incoming refill from patient via [employer] by patient or caregiver    Medication requested:   Pending Prescriptions:                       Disp   Refills    venlafaxine (EFFEXOR XR) 150 MG 24 hr cap*30 cap*0            Sig: Take 1 capsule (150 mg) by mouth daily. For more           refills,schedule an appointment at [phone]      From chart note:   Medications:   - Continue Effexor  mg at bedtime (per BINTA Barragan MD's 11/7/2024 note, Effexor XR dose was decreased back to 150mg/day)  - Continue hydroxyzine to 25-50 mg PO BID PRN, anxiety  - Melatonin 1 mg po at bedtime (also discussed a future option of gabapentin or muscle relaxant due to hx of pain at night)    Is note signed/closed? Yes    Is this a 90 day request for a psych medication? No    LPNs - Please check  if controlled and send to provider  Others - Send to RNs

## 2025-04-24 NOTE — TELEPHONE ENCOUNTER
RX also requested via [employer]. Closing this encounter and doing the refill in that encounter.    - Jaison Kaiser, Visit Facilitator

## 2025-04-24 NOTE — TELEPHONE ENCOUNTER
Last seen: 04/03/2025  RTC: 9 Weeks  Any patient initiated cancellations or no shows since last visit? No  Next appt: 06/05/2025  Last filled: 03/28/2025     Incoming refill from patient via phone by patient or caregiver    Medication requested:   No prescriptions requested or ordered in this encounter      From chart note:   Medications:   - Continue Effexor  mg at bedtime (per BINTA Barragan MD's 11/7/2024 note, Effexor XR dose was decreased back to 150mg/day)  - Continue hydroxyzine to 25-50 mg PO BID PRN, anxiety  - Melatonin 1 mg po at bedtime (also discussed a future option of gabapentin or muscle relaxant due to hx of pain at night)    Is note signed/closed? Yes    Is this a 90 day request for a psych medication? No    LPNs - Please check  if controlled and send to provider  Others - Send to RNs

## 2025-04-24 NOTE — TELEPHONE ENCOUNTER
Nurse Triage SBAR    Is this a 2nd Level Triage? NO    Situation: URI    Background: URI symptoms started 4/12/25.  Patient gets vaginal sores/ulcers when she is sick.  Has steroid cream to help.    Assessment:   Patient and mother are calling stating that the patient started having a sore throat, cough, and runny nose on 4/12.  Symptoms started to go away but have come back.  Past couple of days, have gotten more sick.  Runny nose, headache, sore throat (comes and goes).  Ulcers had been gone for 4-5 days but are back in vagina today.  That has never happened before- that the ulcers went away and came back within a few days.  Feeling fine when at work.  Missed 1-2 days of work last week.  Left early from work on Monday.  Taking ibuprofen.  Tired.  Headache.  Able to breath through nose.  97.8 forehead.  Productive cough but swallows secretions.      Protocol Recommended Disposition:   See PCP Within 3 Days    Recommendation: Care advice given per protocol.  Recommend patient be seen within the next 2-3 days.  Explained that if there are no clinic appointments, Urgent Care would be an appropriate place to be seen.  Patient and mom verbalized understanding information and then they were transferred to the answering service to check for clinic appointments.  They did not want any information on UC locations.          Does the patient meet one of the following criteria for ADS visit consideration? 16+ years old, with an [facility] PCP     TIP  Providers, please consider if this condition is appropriate for management at one of our Acute and Diagnostic Services sites.     If patient is a good candidate, please use dotphrase <dot>triageresponse and select Refer to ADS to document.    Reason for Disposition   [1] Sinus congestion (pressure, fullness) AND [2] present > 10 days    Additional Information   Negative: SEVERE difficulty breathing (e.g., struggling for each breath, speaks in single words)   Negative: Sounds like a  life-threatening emergency to the triager   Negative: [1] Difficulty breathing AND [2] not from stuffy nose (e.g., not relieved by cleaning out the nose)   Negative: Runny nose is caused by pollen or other allergies   Negative: Cough is main symptom   Negative: Severe sore throat   Negative: Fever > 104 F (40 C)   Negative: Patient sounds very sick or weak to the triager   Negative: [1] Fever > 101 F (38.3 C) AND [2] age > 60 years   Negative: [1] Fever > 100.0 F (37.8 C) AND [2] bedridden (e.g., CVA, chronic illness, recovering from surgery)   Negative: [1] Fever > 100.0 F (37.8 C) AND [2] diabetes mellitus or weak immune system (e.g., HIV positive, cancer chemo, splenectomy, organ transplant, chronic steroids)   Negative: Fever present > 3 days (72 hours)   Negative: [1] Fever returns after gone for over 24 hours AND [2] symptoms worse or not improved   Negative: [1] Sinus pain (not just congestion) AND [2] fever   Negative: Earache   Negative: [1] SEVERE sore throat AND [2] present > 24 hours    Protocols used: Common Cold-A-AH

## 2025-04-24 NOTE — TELEPHONE ENCOUNTER
[facility] Call Center    Phone Message    May a detailed message be left on voicemail: yes     Reason for Call: Medication Refill Request    Has the patient contacted the pharmacy for the refill? Yes   Name of medication being requested: Effexor 150mg  Provider who prescribed the medication: Dr. 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

## 2025-06-05 ENCOUNTER — VIRTUAL VISIT (OUTPATIENT)
Dept: PSYCHIATRY | Facility: CLINIC | Age: 20
End: 2025-06-05
Attending: PSYCHIATRY & NEUROLOGY
Payer: COMMERCIAL

## 2025-06-05 DIAGNOSIS — F33.40 MAJOR DEPRESSIVE DISORDER, RECURRENT, IN REMISSION: ICD-10-CM

## 2025-06-05 DIAGNOSIS — F41.1 GENERALIZED ANXIETY DISORDER: Primary | ICD-10-CM

## 2025-06-05 PROCEDURE — G2211 COMPLEX E/M VISIT ADD ON: HCPCS | Mod: 95

## 2025-06-05 PROCEDURE — 98006 SYNCH AUDIO-VIDEO EST MOD 30: CPT | Mod: U7

## 2025-06-05 PROCEDURE — 1126F AMNT PAIN NOTED NONE PRSNT: CPT | Mod: 95

## 2025-06-05 RX ORDER — VENLAFAXINE HYDROCHLORIDE 150 MG/1
150 CAPSULE, EXTENDED RELEASE ORAL DAILY
Qty: 30 CAPSULE | Refills: 1 | Status: SHIPPED | OUTPATIENT
Start: 2025-06-05

## 2025-06-05 ASSESSMENT — ANXIETY QUESTIONNAIRES
3. WORRYING TOO MUCH ABOUT DIFFERENT THINGS: MORE THAN HALF THE DAYS
2. NOT BEING ABLE TO STOP OR CONTROL WORRYING: NEARLY EVERY DAY
6. BECOMING EASILY ANNOYED OR IRRITABLE: MORE THAN HALF THE DAYS
IF YOU CHECKED OFF ANY PROBLEMS ON THIS QUESTIONNAIRE, HOW DIFFICULT HAVE THESE PROBLEMS MADE IT FOR YOU TO DO YOUR WORK, TAKE CARE OF THINGS AT HOME, OR GET ALONG WITH OTHER PEOPLE: SOMEWHAT DIFFICULT
7. FEELING AFRAID AS IF SOMETHING AWFUL MIGHT HAPPEN: SEVERAL DAYS
8. IF YOU CHECKED OFF ANY PROBLEMS, HOW DIFFICULT HAVE THESE MADE IT FOR YOU TO DO YOUR WORK, TAKE CARE OF THINGS AT HOME, OR GET ALONG WITH OTHER PEOPLE?: SOMEWHAT DIFFICULT
5. BEING SO RESTLESS THAT IT IS HARD TO SIT STILL: MORE THAN HALF THE DAYS
4. TROUBLE RELAXING: MORE THAN HALF THE DAYS
1. FEELING NERVOUS, ANXIOUS, OR ON EDGE: NEARLY EVERY DAY
7. FEELING AFRAID AS IF SOMETHING AWFUL MIGHT HAPPEN: SEVERAL DAYS
GAD7 TOTAL SCORE: 15

## 2025-06-05 ASSESSMENT — PATIENT HEALTH QUESTIONNAIRE - PHQ9
SUM OF ALL RESPONSES TO PHQ QUESTIONS 1-9: 1
SUM OF ALL RESPONSES TO PHQ QUESTIONS 1-9: 1
10. IF YOU CHECKED OFF ANY PROBLEMS, HOW DIFFICULT HAVE THESE PROBLEMS MADE IT FOR YOU TO DO YOUR WORK, TAKE CARE OF THINGS AT HOME, OR GET ALONG WITH OTHER PEOPLE: NOT DIFFICULT AT ALL

## 2025-06-05 ASSESSMENT — PAIN SCALES - GENERAL: PAINLEVEL_OUTOF10: NO PAIN (0)

## 2025-06-05 NOTE — NURSING NOTE
Is the patient currently in the state of [place]? YES    Current patient location: [address] [place], [place] [id number]    Visit mode:Video    If the visit is dropped, the patient can be reconnected by: VIDEO VISIT: Text to cell phone:   Telephone Information:   Mobile [phone]       Will anyone else be joining the visit? No  (If patient encounters technical issues they should call [phone])    Are changes needed to the allergy or medication list? No    Are refills needed on medications prescribed by this physician? Yes    Rooming Documentation: Questionnaire(s) completed.    Reason for visit: RECHECK     SUN Toro

## 2025-06-05 NOTE — PROGRESS NOTES
"Virtual Visit Details  Type of service:  Video Visit     Start time: 10:10 am  End time: 10:35 am  Originating Location (pt. Location): Home  Distant Location (provider location):  On-site  Platform used for Video Visit: [facility]  Psychiatry Progress Note     CARE TEAM:    PCP- Yen Jauregui  Therapist- None    Luz is a 20 year old who uses the pronouns she, her, hers.   Identifies as a adhikari female     Diagnoses     Major depressive disorder, recurrent, in full remission with seasonal pattern    NILS      Assessment     Veronique Webber is a 21 yo with past psychiatric diagnoses of depression and anxiety especially during her school years. 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    Today, overall, Veronique reports her mood has been \"good\" and might have been experiencing more anxiety in the context of increased workload and buying new house. However, she reports that these stressors are temporary, and will eventually help her with decreasing workload and having better out-of-work life, including social life and hobbies. She also feels like she has been handling stressors well and has not been needing prn medications for anxiety. Her depression has been stable and she is motivated, and there was no safety concerns including SI/HI. She notices that her sleep has been disruptive recently with many thoughts on her new housing issues. She was interested in trying hydroxyzine for the sleep.    She states she is in a good place in terms of mental health and wants to continue current medication regimen without change. She is not interested in finding therapist but was interested in continuing this discussion in the future session.     Future Considerations:  -Consider " optimizing Venlafaxine to 225 if depressive symptoms were to re-emerge.  -Gabapentin or flexeril  -Hydroxyzine has been helpful for anxiety and if this was to worsen, consider using propranolol, gabapentin or Buspar or flexeril   -Planning to find a psychiatrist for her in the community if she remains stable in the next visit.    Psychotropic Drug Interactions:  [PSYCHCLINICDDI]  ADDITIVE SEROTONERGIC: Sumatriptan, Venlafaxine  Management: routine monitoring and patient aware of risks     [place] was not checked today: not using controlled substances    Risk Statements:   Treatment Risk- Risks, benefits, alternatives and potential adverse effects have been discussed and are understood.   Safety Risk-Luz did not appear to be an imminent safety risk to self or others.      Plan     1) Medications:   - Continue Effexor  mg at bedtime (per BINTA Barragan MD's 11/7/2024 note, Effexor XR dose was decreased back to 150mg/day)  - Continue hydroxyzine to 25-50 mg PO BID PRN, anxiety  - Melatonin 1 mg po at bedtime (also discussed a future option of gabapentin or muscle relaxant due to hx of pain at night)    Other:   -OTC. Vitamin D supplementation (50 mcg daily) for Vitamin D insufficiency   -Sumatriptan 50 mg PO may repeat q 2 hrs, max of 4 tabs in 24 hrs. Prescribed by PCP--Patient advised to re-contact her PCP to resume or referral for neurology/migraine care     2) Psychotherapy:   On 2/27/2025, Veronique was open to a referral for therapy which was placed on that day, though subsequently Veronique's decided against doing therapy for now.    3) Next due:  Labs- Routine monitoring is not indicated for current psychotropic medication regimen   EKG- Routine monitoring is not indicated for current psychotropic medication regimen   Rating scales- PHQ9, GAD7     4) Referrals:   None    5) LABs/Other:   -No labs today, recommend reconnecting with PCP, may repeat TSH if not done there  -Recommended continue working with PCP  "for migraine management/ref to Neuro.     6) Follow-up: Return to clinic in 9 weeks       Pertinent Background                                                   [most recent eval 01/09/24]   Luz is a reports that she has experienced anxiety her entire life, but symptoms noticeably worsened around middle school, at a time where she had some changes in friend groups. She first sought therapy in October 2020 at age 15, where she was diagnosed with depression and anxiety.  Has tried a couple of selective serotonin reuptake inhibitors (poorly tolerated, see table below), before starting on SNRI, Venlafaxine, starting in 2021-currently.  Initially treated by PCP but later seen at the [facility] clinic since October 2021 to June 2023. Transferred to PCP (uncomfortable prescribing antidepressant) and then adult  here in Jan 2024.  Individual Therapy: from 2021 to 2021 (CBT).  Med Hx: Struggles with some chronic back and shoulder pain as well as migraines.    Pertinent items include: [N/A] no prior hospitalizations, no IOP/PHP, no SI/SA hx, no psychosis.      Hx of father having heart issues and having a serious MVA (traumatic event for the patient); hx of perfectionism and excessive worry about other people's feelings.  Hx of \"growing up too fast\"/ having a lot of responsibility at younger age.      Subjective     Since last visit:  - Has been doing \"good\" though \"busy\". Work has been going well but has been very busy, and recently bought a house. But denied feeling stressed out and felt like she has been managing them very well. Feels like this current situation will help her in a long run and optimistic about things will get better soon.   - Mood has been \"very anxious, about everything in general\" but states it is valid anxiety given her current situation and this anxiety has been manageable with coping skills. No anxiety attack or panic attack.   - Had hard time sleeping due to \"overthinking\" about her new house, but " "feels like this is temporary.   - Has not been utilizing hydroxyzine but was interested in trying that for sleep.  - Eating: has not been great d/t anxiety and busy schedule but has been trying to get nutrients through protein shake.  - No SI/HI  - Don't want to make any medication change for today, think she is in a good place.      Pertinent Social History:  Financial/occupational: Owns a coffee shop   Living situation:  Lives at parents home.  Social/spiritual support: sisters, mom, some friends.   Feels safe at home: Yes    Pertinent Substance Use:   Alcohol: Yes: Casual probably 1 night a week, just small beer  Cannabis: Yes: 1 THC gummy (5 mg) once a month, given by her dad. Denies problematic use, cravings, noticing impact in mood or anxiety or other concerns currently. Continue to monitor closely/offer resources as needed.   Tobacco: No  Caffeine:  Yes: Drinks 4-5 cups a day. May use it in the late afternoon/evening.   Opioids: No   Narcan Kit current: No  Other substances: none    Continue to explore ADHD family risk (from previous visit with Dr. Barragan 12/05/24):  As a child:  -Math was hard; always anxious at school, asking for help was hard.  Did not want others to know that she was struggling (math and physics, spelling/grammar, British).  Some daydreaming or feeling distracted.  Worried about \"stomach growling\", anxious about public speaking, talking to teacher, avoided social parties/anxious about it,   -able to get homework in elementary school; if interested in class  -more trouble with math or British also very anxious there.  A lot of worry about those classes. A lot of anxiety about school and test scores (embarrassed about grades).  7th and 8th grade \"C\" in math, F at some tests.  GPA=3.4.    -Pushed herself in first and second year of HS, took AP but \"went on line because of COVID\" and never went back.  She reports \"I stopped caring\" in carrie and senior year of HS.  Was working at the coffee " "shop and \"found my passion\". Glad to not be in collage.  Was anticipating needing to make money for collage. Now parents are doing better financially and per patient spoiling her sister/parenting her very differently than the patient was treated.    -Feels more financially able and more mature than others in her family.  Patient feels sister gets to have new hockey gear while the patient had to use the same gear for many years.   -Loved being on the go, working out / play hockey, exercising, being busy all day in .  Also liked being connected to family via hockey.  All cousins play hockey.  Feeling disconnected from family now because age difference and not playing hockey as much.  Feeling like relationships with siblings is complicated and she is anxious about relationships with her sisters.       Mental Status Exam     Alertness: alert  and oriented  Appearance: well groomed  Behavior/Demeanor: cooperative, pleasant, and calm, with good  eye contact   Speech: regular rate and rhythm  Language: intact and no problems  Psychomotor: normal or unremarkable  Mood: \"fine\"   Affect: full range; congruent to: mood- yes, content- yes  Thought Process/Associations: unremarkable  Thought Content:  Reports none;  Denies suicidal & violent ideation and delusions, delusions , obsessions , phobia , magical thinking, over-valued ideas, and paranoid ideation  Perception:  Reports none;  Denies auditory hallucinations, visual hallucinations, visual distortion seen as shadows , depersonalization, and derealization  Insight: good  Judgment: good  Cognition: does  appear grossly intact; formal cognitive testing was not done  Gait and Station: N/A (telehealth)     Social History                                 pt reported     Financial/employment: 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   Living situation/family: mother (Radha), father and younger sister. Gets along well with family members. Also has older half-sister " "who she is close to.   Children: No  Social/spiritual support: See above for current.  Education: Completed HS, Completed PSEO at [employer]. Reports getting \"decent\" grades, in good academic standing. No history of 504 or IEP.   Early history:   Raised by: biological parents   Legal:  Denies      Family Mental Health History                                 pt reported     Mother: MDD, anxiety  Dad: Seasonal depression, anxiety.   Denies hx of bipolar disorder or psychotic disorders   Paternal  grandparent some \"heart issues.\"      Past Psychiatric History     Self injurious behavior [method, most recent]: No  Suicide attempt [#, most recent, method]: No  Suicidal ideation hx [passive, active]: No    Violence/Aggression Hx:No   Psychosis Hx: No   Eating Disorder Hx: No  Trauma hx: No    Psych Hosp [#, most recent]: No   Commitment: No   TMS/ECT: No   Outpatient Programs [Day treatment, DBT, eating disorder tx, etc]: No    SUBSTANCE USE HISTORY   Past Use: No  Treatment [#, most recent]: No   Medical Consequences: No   Legal Consequences: No      Past Psych Med Trials        Medication Max Dose (mg) Dates / Duration Helpful? DC Reason / Adverse Effects?   Sertraline   For 1 month Feb 2020  N caused GI upset, discontinued    Fluoxetine  Up to 40 mg Mar - Oct 2020  Partially  caused nausea, only partially helpful for anxiety, emotional blunting motional blunting, and pt was tending less to her needs (messy room, forgot to feed gecko and it passed away). Depression worse with increased dose   Venlafaxine 150 mg -->187.5 mg October 2021-present   Increase to 187.5 mg Oct 2024 Y              Vitals   There were no vitals taken for this visit.  Pulse Readings from Last 3 Encounters:   02/13/24 83   09/15/23 97   05/26/23 101     Wt Readings from Last 3 Encounters:   12/05/24 52.2 kg (115 lb)   11/07/24 52.2 kg (115 lb)   10/10/24 52.2 kg (115 lb)     BP Readings from Last 3 Encounters:   02/13/24 128/86 "   09/15/23 118/77   05/26/23 137/88        Medical History     ALLERGIES: Patient has no known allergies.    Patient Active Problem List   Diagnosis    Acne vulgaris    Generalized anxiety disorder    Major depressive disorder, recurrent, moderate (H)    Encounter for preventive care    Spondylolysis    Migraine with aura and with status migrainosus, not intractable    Medication monitoring encounter    Kyphosis (acquired) (postural)    Chronic right shoulder pain    Preop general physical exam        Medications     **Per BINTA Barragan MD's 11/7/2024 note, Effexor XR dose was decreased back to 150mg/day.**    Current Outpatient Medications   Medication Sig Dispense Refill    adapalene (DIFFERIN) 0.1 % external cream [ADAPALENE (DIFFERIN) 0.1 % CREAM] Apply to affected area nightly 45 g 1    cholecalciferol, vitamin D3, (VITAMIN D3 ORAL) [CHOLECALCIFEROL, VITAMIN D3, (VITAMIN D3 ORAL)] Take by mouth.      hydrOXYzine luis manuel (VISTARIL) 25 MG capsule Take 1-2 capsules (25-50 mg) by mouth 2 times daily as needed for anxiety 120 capsule 3    SUMAtriptan (IMITREX) 50 MG tablet Take 1 tablet (50 mg) by mouth at onset of headache for migraine May repeat in 2 hours. Max 4 tablets/24 hours. 12 tablet 3    venlafaxine (EFFEXOR XR) 150 MG 24 hr capsule Take 1 capsule (150 mg) by mouth daily. 30 capsule 1        Labs and Data         1/23/2024     6:42 AM 10/10/2024    10:53 AM 2/27/2025     1:22 PM   PROMIS-10 Total Score w/o Sub Scores   PROMIS TOTAL - SUBSCORES 21 25 25        Proxy-reported         1/10/2024     8:02 AM 1/10/2024     8:15 AM   CAGE-AID Total Score   Total Score 0 0   Total Score MyChart  0 (A total score of 2 or greater is considered clinically significant)         12/5/2024     1:50 PM 2/27/2025     1:20 PM 4/3/2025     1:18 PM   PHQ-9 SCORE   PHQ-9 Total Score MyChart 9 (Mild depression) 9 (Mild depression) 9 (Mild depression)   PHQ-9 Total Score 9  9  9        Patient-reported    Proxy-reported          "5/26/2023     3:05 PM 1/10/2024     8:00 AM 1/10/2024     8:15 AM   NILS-7 SCORE   Total Score 10 (moderate anxiety)  9 (mild anxiety)   Total Score 10 9 9       Liver/Kidney Function, TSH Metabolic Blood counts   Recent Labs   Lab Test 09/15/23  1405 06/01/23  1051   AST 24 20   ALT 16 16   ALKPHOS 54 61   CR 0.70 0.78     Recent Labs   Lab Test 03/19/21  1530   TSH 1.31    Recent Labs   Lab Test 06/01/23  1051   CHOL 179*   TRIG 64   LDL 97   HDL 69     No lab results found.  Recent Labs   Lab Test 09/15/23  1405   GLC 89    Recent Labs   Lab Test 02/13/24  1134   WBC 5.7   HGB 14.5   HCT 42.7   MCV 86             October/2021 ECG  QTc = 463 ms, followed by 12-day Holer study for \"irregular heartbeat\" with normal results, per records.    PROVIDER: Isi Maldonado MD PhD  This patient was not staffed with attending, my supervisor Dr. Radford will review this note     Level of Medical Decision Making:   - At least 1 chronic problem that is not stable  - Engaged in prescription drug management during visit (discussed any medication benefits, side effects, alternatives, etc.)    The longitudinal plan of care for the diagnosis(es)/condition(s) as documented were addressed during this visit. Due to the added complexity in care, I will continue to support Veronique in the subsequent management and with ongoing continuity of care.     Answers submitted by the patient for this visit:  Patient Health Questionnaire (Submitted on 6/5/2025)  If you checked off any problems, how difficult have these problems made it for you to do your work, take care of things at home, or get along with other people?: Not difficult at all  PHQ9 TOTAL SCORE: 1  Patient Health Questionnaire (G7) (Submitted on 6/5/2025)  NILS 7 TOTAL SCORE: 15    "

## 2025-06-05 NOTE — PATIENT INSTRUCTIONS
**For crisis resources, please see the information at the end of this document**   Patient Education    Thank you for coming to the [facility] [place] [facility].     Lab Testing:  If you had lab testing today and your results are reassuring or normal they will be mailed to you or sent through [employer] within 7 days. If the lab tests need quick action we will call you with the results. The phone number we will call with results is # [phone]. If this is not the best number please call our clinic and change the number.     Medication Refills:  If you need any refills please call your pharmacy and they will contact us. Our fax number for refills is [phone].   Three business days of notice are needed for general medication refill requests.   Five business days of notice are needed for controlled substance refill requests.   If you need to change to a different pharmacy, please contact the new pharmacy directly. The new pharmacy will help you get your medications transferred.     Contact Us:  Please call [phone] during business hours (8-5:00 M-F).   If you have medication related questions after clinic hours, or on the weekend, please call [phone].     Financial Assistance [phone]   Medical Records [phone]       MENTAL HEALTH CRISIS RESOURCES:  For a emergency help, please call 911 or go to the nearest Emergency Department.     Emergency Walk-In Options:   EmPATH Unit @ [facility] ([place]): [phone] - Specialized mental health emergency area designed to be calming  [facility] ([place]): [phone]  [facility] ([place]): [phone]  [facility]): [phone]    [facility] Crisis Information:   [place]: [phone]  [place]: [phone]  Jasbir (RICHARD) - Adult: [phone]     Child: [phone]  Mal[place] - Adult: [phone]     Child: [phone]  [place]:  [phone]  List of all [facility] resources:   https://[place].gov/dhs/people-we-serve/adults/health-care/mental-health/resources/crisis-contacts.jsp    National Crisis Information:   Crisis Text Line: Text  [place]  to 601141  Suicide & Crisis Lifeline: 988  National Suicide Prevention Lifeline: 4-359-542-TALK (1-623.195.2715)       For online chat options, visit https://suicidepreventionlifeline.org/chat/  Poison Control Center: 9-535-751-8056  Trans Lifeline: 8-351-262-3721 - Hotline for transgender people of all ages  The Kt Project: 7-615-375-0920 - Hotline for LGBT youth     For Non-Emergency Support:   Fast Tracker: Mental Health & Substance Use Disorder Resources -   https://www.[employer].comn.org/

## 2025-06-24 DIAGNOSIS — F41.1 GENERALIZED ANXIETY DISORDER: ICD-10-CM

## 2025-06-24 RX ORDER — VENLAFAXINE HYDROCHLORIDE 150 MG/1
150 CAPSULE, EXTENDED RELEASE ORAL DAILY
Qty: 90 CAPSULE | Refills: 0 | Status: SHIPPED | OUTPATIENT
Start: 2025-06-24

## 2025-06-24 NOTE — TELEPHONE ENCOUNTER
Writer called patient who clarified they know there are refills remaining. They are requesting a new order for 90 day supply as insurance won't cover this medication as a 30 day supply with refills.    Fredi Evans LPN

## 2025-06-24 NOTE — TELEPHONE ENCOUNTER
[facility] Call Center    Phone Message    May a detailed message be left on voicemail: yes     Reason for Call: Medication Refill Request    Has the patient contacted the pharmacy for the refill? Yes     Name of medication being requested: Effexor  MG     Provider who prescribed the medication: Erica Snowden     Pharmacy: [address] [place] [phone]     Date medication is needed: ASAP (Patient stated she ran out of medication last night)     Patient stated she needs a refill for 90 days so insurance can cover.     Action Taken: Other: [facility]     Travel Screening: Not Applicable

## 2025-07-16 NOTE — PROGRESS NOTES
"Virtual Visit Details  Type of service:  Video Visit     Start time: 10:30 am  End time: 10:55 am  Originating Location (pt. Location): Home  Distant Location (provider location):  On-site  Platform used for Video Visit: [facility]  Psychiatry Progress Note     CARE TEAM:    PCP- Yen Jauregui  Therapist- None    Luz is a 20 year old who uses the pronouns she, her, hers.   Identifies as a adhikari female     Diagnoses     Major depressive disorder, recurrent, in full remission with seasonal pattern    NILS      Assessment     Veronique Webber is a 21 yo with past psychiatric diagnoses of depression and anxiety especially during her school years. 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    Today, overall, Veronique reports her mood has been \"good\" and might have been experiencing similar level of situational anxiety which has been manageable with her coping skills. She also feels like she has been handling stressors well and has not been needing prn medications for anxiety though she very rarely utilizes hydroxyzine for sleep. Her depression has been stable and she is motivated, and there was no safety concerns including SI/HI.    She states she is in a good place in terms of mental health and wants to continue current medication regimen without change. She is not interested in finding therapist but was interested in continuing this discussion in the future session.     Future Considerations:  -Consider optimizing Venlafaxine to 225 if depressive symptoms were to re-emerge.  -Gabapentin or flexeril  -Hydroxyzine has been helpful for anxiety and if this was to worsen, consider using propranolol, gabapentin or Buspar or flexeril   -Planning to find a psychiatrist for her in the " community if she remains stable in the next visit.    Psychotropic Drug Interactions:  [PSYCHCLINICDDI]  ADDITIVE SEROTONERGIC: Sumatriptan, Venlafaxine  Management: routine monitoring and patient aware of risks     MNPMP was not checked today: not using controlled substances    Risk Statements:   Treatment Risk- Risks, benefits, alternatives and potential adverse effects have been discussed and are understood.   Safety Risk-Luz did not appear to be an imminent safety risk to self or others.      Plan     1) Medications:   - Continue Effexor  mg at bedtime (per BINTA Barragan MD's 11/7/2024 note, Effexor XR dose was decreased back to 150mg/day)  - Continue hydroxyzine to 25-50 mg PO BID PRN, anxiety  - Melatonin 1 mg po at bedtime (also discussed a future option of gabapentin or muscle relaxant due to hx of pain at night)    Other:   -OTC. Vitamin D supplementation (50 mcg daily) for Vitamin D insufficiency   -Sumatriptan 50 mg PO may repeat q 2 hrs, max of 4 tabs in 24 hrs. Prescribed by PCP--Patient advised to re-contact her PCP to resume or referral for neurology/migraine care     2) Psychotherapy:   On 2/27/2025, Veronique was open to a referral for therapy which was placed on that day, though subsequently Veronique's decided against doing therapy for now.    3) Next due:  Labs- Routine monitoring is not indicated for current psychotropic medication regimen   EKG- Routine monitoring is not indicated for current psychotropic medication regimen   Rating scales- PHQ9, GAD7     4) Referrals:   None    5) LABs/Other:   -No labs today, recommend reconnecting with PCP, may repeat TSH if not done there  -Recommended continue working with PCP for migraine management/ref to Neuro.     6) Follow-up: Return to clinic in 12 weeks       Pertinent Background                                                   [most recent eval 01/09/24]   Luz is a reports that she has experienced anxiety her entire life, but symptoms  "noticeably worsened around middle school, at a time where she had some changes in friend groups. She first sought therapy in October 2020 at age 15, where she was diagnosed with depression and anxiety.  Has tried a couple of selective serotonin reuptake inhibitors (poorly tolerated, see table below), before starting on SNRI, Venlafaxine, starting in 2021-currently.  Initially treated by PCP but later seen at the [facility] clinic since October 2021 to June 2023. Transferred to PCP (uncomfortable prescribing antidepressant) and then adult  here in Jan 2024.  Individual Therapy: from 2021 to 2021 (CBT).  Med Hx: Struggles with some chronic back and shoulder pain as well as migraines.    Pertinent items include: [N/A] no prior hospitalizations, no IOP/PHP, no SI/SA hx, no psychosis.      Hx of father having heart issues and having a serious MVA (traumatic event for the patient); hx of perfectionism and excessive worry about other people's feelings.  Hx of \"growing up too fast\"/ having a lot of responsibility at younger age.      Subjective     Since last visit:  - Been doing good, but still pretty busy.  - Has been having \"normal anxiety\" related to situations, like moving and works. But this has been manageable with her coping skills. Discussed recognizing thought-action-feeling cycles and how to control anxiety. Has been trying to be better at recognizing her feelings in the moment to control it and will continue working on that.  - Sleep: sleeping okay, 6-7 hours of sleep. This is not a tone of sleep since she has a lot of things to do but denied having any problem with falling into or staying in sleep. Feels okay with current sleep patterns.  - Denied any symptoms depression.  - Currently has a lot of things to do, including housing and work, but feels like this will get settled down soon. She feels good about making progress on both.  - Don't think she needs help with her current anxiety  - Wants to continue " "medication without change  - No safety concerns including SI/HI    Pertinent Social History:  Financial/occupational: Owns a coffee shop   Living situation:  Lives at parents home.  Social/spiritual support: sisters, mom, some friends.   Feels safe at home: Yes    Pertinent Substance Use:   Alcohol: Yes: Casual probably 1 night a week, just small beer  Cannabis: Yes: 1 THC gummy (5 mg) once a month, given by her dad. Denies problematic use, cravings, noticing impact in mood or anxiety or other concerns currently. Continue to monitor closely/offer resources as needed.   Tobacco: No  Caffeine:  Yes: Drinks 4-5 cups a day. May use it in the late afternoon/evening.   Opioids: No   Narcan Kit current: No  Other substances: none    Continue to explore ADHD family risk (from previous visit with Dr. Barragan 12/05/24):  As a child:  -Math was hard; always anxious at school, asking for help was hard.  Did not want others to know that she was struggling (math and physics, spelling/grammar, Portuguese).  Some daydreaming or feeling distracted.  Worried about \"stomach growling\", anxious about public speaking, talking to teacher, avoided social parties/anxious about it,   -able to get homework in elementary school; if interested in class  -more trouble with math or Portuguese also very anxious there.  A lot of worry about those classes. A lot of anxiety about school and test scores (embarrassed about grades).  7th and 8th grade \"C\" in math, F at some tests.  GPA=3.4.    -Pushed herself in first and second year of HS, took AP but \"went on line because of COVID\" and never went back.  She reports \"I stopped caring\" in carrie and senior year of HS.  Was working at the coffee shop and \"found my passion\". Glad to not be in collage.  Was anticipating needing to make money for collage. Now parents are doing better financially and per patient spoiling her sister/parenting her very differently than the patient was treated.    -Feels more " "financially able and more mature than others in her family.  Patient feels sister gets to have new hockey gear while the patient had to use the same gear for many years.   -Loved being on the go, working out / play hockey, exercising, being busy all day in HS.  Also liked being connected to family via hockey.  All cousins play hockey.  Feeling disconnected from family now because age difference and not playing hockey as much.  Feeling like relationships with siblings is complicated and she is anxious about relationships with her sisters.       Mental Status Exam     Alertness: alert  and oriented  Appearance: well groomed  Behavior/Demeanor: cooperative, pleasant, and calm, with good  eye contact   Speech: regular rate and rhythm  Language: intact and no problems  Psychomotor: normal or unremarkable  Mood: \"fine\"   Affect: full range; congruent to: mood- yes, content- yes  Thought Process/Associations: unremarkable  Thought Content:  Reports none;  Denies suicidal & violent ideation and delusions, delusions , obsessions , phobia , magical thinking, over-valued ideas, and paranoid ideation  Perception:  Reports none;  Denies auditory hallucinations, visual hallucinations, visual distortion seen as shadows , depersonalization, and derealization  Insight: good  Judgment: good  Cognition: does  appear grossly intact; formal cognitive testing was not done  Gait and Station: N/A (telehealth)     Social History                                 pt reported     Financial/employment: 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   Living situation/family: mother (Radha), father and younger sister. Gets along well with family members. Also has older half-sister who she is close to.   Children: No  Social/spiritual support: See above for current.  Education: Completed HS, Completed PSE[employer] at [employer]. Reports getting \"decent\" grades, in good academic standing. No history of 504 or IEP.   Early history: " "  Raised by: biological parents   Legal:  Denies      Family Mental Health History                                 pt reported     Mother: MDD, anxiety  Dad: Seasonal depression, anxiety.   Denies hx of bipolar disorder or psychotic disorders   Paternal  grandparent some \"heart issues.\"      Past Psychiatric History     Self injurious behavior [method, most recent]: No  Suicide attempt [#, most recent, method]: No  Suicidal ideation hx [passive, active]: No    Violence/Aggression Hx:No   Psychosis Hx: No   Eating Disorder Hx: No  Trauma hx: No    Psych Hosp [#, most recent]: No   Commitment: No   TMS/ECT: No   Outpatient Programs [Day treatment, DBT, eating disorder tx, etc]: No    SUBSTANCE USE HISTORY   Past Use: No  Treatment [#, most recent]: No   Medical Consequences: No   Legal Consequences: No      Past Psych Med Trials        Medication Max Dose (mg) Dates / Duration Helpful? DC Reason / Adverse Effects?   Sertraline   For 1 month Feb 2020  N caused GI upset, discontinued    Fluoxetine  Up to 40 mg Mar - Oct 2020  Partially  caused nausea, only partially helpful for anxiety, emotional blunting motional blunting, and pt was tending less to her needs (messy room, forgot to feed gecko and it passed away). Depression worse with increased dose   Venlafaxine 150 mg -->187.5 mg October 2021-present   Increase to 187.5 mg Oct 2024 Y              Vitals   There were no vitals taken for this visit.  Pulse Readings from Last 3 Encounters:   02/13/24 83   09/15/23 97   05/26/23 101     Wt Readings from Last 3 Encounters:   12/05/24 52.2 kg (115 lb)   11/07/24 52.2 kg (115 lb)   10/10/24 52.2 kg (115 lb)     BP Readings from Last 3 Encounters:   02/13/24 128/86   09/15/23 118/77   05/26/23 137/88        Medical History     ALLERGIES: Patient has no known allergies.    Patient Active Problem List   Diagnosis    Acne vulgaris    Generalized anxiety disorder    Major depressive disorder, recurrent, moderate (H)    Encounter " for preventive care    Spondylolysis    Migraine with aura and with status migrainosus, not intractable    Medication monitoring encounter    Kyphosis (acquired) (postural)    Chronic right shoulder pain    Preop general physical exam        Medications     **Per BINTA Barragan MD's 11/7/2024 note, Effexor XR dose was decreased back to 150mg/day.**    Current Outpatient Medications   Medication Sig Dispense Refill    adapalene (DIFFERIN) 0.1 % external cream [ADAPALENE (DIFFERIN) 0.1 % CREAM] Apply to affected area nightly 45 g 1    cholecalciferol, vitamin D3, (VITAMIN D3 ORAL) [CHOLECALCIFEROL, VITAMIN D3, (VITAMIN D3 ORAL)] Take by mouth.      hydrOXYzine luis manuel (VISTARIL) 25 MG capsule Take 1-2 capsules (25-50 mg) by mouth 2 times daily as needed for anxiety 120 capsule 3    SUMAtriptan (IMITREX) 50 MG tablet Take 1 tablet (50 mg) by mouth at onset of headache for migraine May repeat in 2 hours. Max 4 tablets/24 hours. 12 tablet 3    venlafaxine (EFFEXOR XR) 150 MG 24 hr capsule Take 1 capsule (150 mg) by mouth daily. 90 capsule 0        Labs and Data         10/10/2024    10:53 AM 2/27/2025     1:22 PM 6/5/2025     9:56 AM   PROMIS-10 Total Score w/o Sub Scores   PROMIS TOTAL - SUBSCORES 25 25  23        Proxy-reported         1/10/2024     8:02 AM 1/10/2024     8:15 AM   CAGE-AID Total Score   Total Score 0 0   Total Score MyChart  0 (A total score of 2 or greater is considered clinically significant)         2/27/2025     1:20 PM 4/3/2025     1:18 PM 6/5/2025     9:54 AM   PHQ-9 SCORE   PHQ-9 Total Score MyChart 9 (Mild depression) 9 (Mild depression) 1 (Minimal depression)   PHQ-9 Total Score 9  9  1        Patient-reported    Proxy-reported         1/10/2024     8:00 AM 1/10/2024     8:15 AM 6/5/2025     9:55 AM   NILS-7 SCORE   Total Score  9 (mild anxiety) 15 (severe anxiety)   Total Score 9 9 15        Proxy-reported       Liver/Kidney Function, TSH Metabolic Blood counts   Recent Labs   Lab Test  "09/15/23  1405 06/01/23  1051   AST 24 20   ALT 16 16   ALKPHOS 54 61   CR 0.70 0.78     Recent Labs   Lab Test 03/19/21  1530   TSH 1.31    Recent Labs   Lab Test 06/01/23  1051   CHOL 179*   TRIG 64   LDL 97   HDL 69     No lab results found.  Recent Labs   Lab Test 09/15/23  1405   GLC 89    Recent Labs   Lab Test 02/13/24  1134   WBC 5.7   HGB 14.5   HCT 42.7   MCV 86             October/2021 ECG  QTc = 463 ms, followed by 12-day Holer study for \"irregular heartbeat\" with normal results, per records.    PROVIDER: Isi Maldonado MD PhD  This patient was not staffed with attending, my supervisor Dr. Radford will review this note     Level of Medical Decision Making:   - At least 1 chronic problem that is not stable  - Engaged in prescription drug management during visit (discussed any medication benefits, side effects, alternatives, etc.)    The longitudinal plan of care for the diagnosis(es)/condition(s) as documented were addressed during this visit. Due to the added complexity in care, I will continue to support Veronique in the subsequent management and with ongoing continuity of care.   Answers submitted by the patient for this visit:  Patient Health Questionnaire (Submitted on 7/17/2025)  If you checked off any problems, how difficult have these problems made it for you to do your work, take care of things at home, or get along with other people?: Somewhat difficult  PHQ9 TOTAL SCORE: 8  Patient Health Questionnaire (G7) (Submitted on 7/17/2025)  NILS 7 TOTAL SCORE: 9    "

## 2025-07-17 ENCOUNTER — VIRTUAL VISIT (OUTPATIENT)
Dept: PSYCHIATRY | Facility: CLINIC | Age: 20
End: 2025-07-17
Attending: PSYCHIATRY & NEUROLOGY
Payer: COMMERCIAL

## 2025-07-17 VITALS — BODY MASS INDEX: 20.38 KG/M2 | WEIGHT: 115 LBS | HEIGHT: 63 IN

## 2025-07-17 DIAGNOSIS — F41.1 GENERALIZED ANXIETY DISORDER: ICD-10-CM

## 2025-07-17 DIAGNOSIS — F33.42 MAJOR DEPRESSIVE DISORDER, RECURRENT, IN FULL REMISSION: Primary | ICD-10-CM

## 2025-07-17 PROCEDURE — 98006 SYNCH AUDIO-VIDEO EST MOD 30: CPT | Mod: U7

## 2025-07-17 PROCEDURE — G2211 COMPLEX E/M VISIT ADD ON: HCPCS | Mod: 95

## 2025-07-17 ASSESSMENT — ANXIETY QUESTIONNAIRES
8. IF YOU CHECKED OFF ANY PROBLEMS, HOW DIFFICULT HAVE THESE MADE IT FOR YOU TO DO YOUR WORK, TAKE CARE OF THINGS AT HOME, OR GET ALONG WITH OTHER PEOPLE?: SOMEWHAT DIFFICULT
7. FEELING AFRAID AS IF SOMETHING AWFUL MIGHT HAPPEN: SEVERAL DAYS
5. BEING SO RESTLESS THAT IT IS HARD TO SIT STILL: SEVERAL DAYS
IF YOU CHECKED OFF ANY PROBLEMS ON THIS QUESTIONNAIRE, HOW DIFFICULT HAVE THESE PROBLEMS MADE IT FOR YOU TO DO YOUR WORK, TAKE CARE OF THINGS AT HOME, OR GET ALONG WITH OTHER PEOPLE: SOMEWHAT DIFFICULT
GAD7 TOTAL SCORE: 9
7. FEELING AFRAID AS IF SOMETHING AWFUL MIGHT HAPPEN: SEVERAL DAYS
6. BECOMING EASILY ANNOYED OR IRRITABLE: SEVERAL DAYS
3. WORRYING TOO MUCH ABOUT DIFFERENT THINGS: SEVERAL DAYS
2. NOT BEING ABLE TO STOP OR CONTROL WORRYING: SEVERAL DAYS
GAD7 TOTAL SCORE: 9
4. TROUBLE RELAXING: SEVERAL DAYS
1. FEELING NERVOUS, ANXIOUS, OR ON EDGE: NEARLY EVERY DAY
GAD7 TOTAL SCORE: 9

## 2025-07-17 ASSESSMENT — PATIENT HEALTH QUESTIONNAIRE - PHQ9
10. IF YOU CHECKED OFF ANY PROBLEMS, HOW DIFFICULT HAVE THESE PROBLEMS MADE IT FOR YOU TO DO YOUR WORK, TAKE CARE OF THINGS AT HOME, OR GET ALONG WITH OTHER PEOPLE: SOMEWHAT DIFFICULT
SUM OF ALL RESPONSES TO PHQ QUESTIONS 1-9: 8
SUM OF ALL RESPONSES TO PHQ QUESTIONS 1-9: 8

## 2025-07-17 NOTE — NURSING NOTE
Current patient location: [address] [place] [address]    Is the patient currently in the state of [place]? YES    Visit mode: VIDEO    If the visit is dropped, the patient can be reconnected by:VIDEO VISIT: Text to cell phone:   Telephone Information:   Mobile [phone]       Will anyone else be joining the visit? NO  (If patient encounters technical issues they should call [phone])    Are changes needed to the allergy or medication list? No    Are refills needed on medications prescribed by this physician? NO    Rooming Documentation:  Questionnaire(s) completed    Reason for visit: RECHECK    Ana GARSIA

## 2025-07-17 NOTE — PATIENT INSTRUCTIONS
**For crisis resources, please see the information at the end of this document**   Patient Education    Thank you for coming to the [facility] [place] [facility].     Lab Testing:  If you had lab testing today and your results are reassuring or normal they will be mailed to you or sent through [employer] within 7 days. If the lab tests need quick action we will call you with the results. The phone number we will call with results is # [phone]. If this is not the best number please call our clinic and change the number.     Medication Refills:  If you need any refills please call your pharmacy and they will contact us. Our fax number for refills is [phone].   Three business days of notice are needed for general medication refill requests.   Five business days of notice are needed for controlled substance refill requests.   If you need to change to a different pharmacy, please contact the new pharmacy directly. The new pharmacy will help you get your medications transferred.     Contact Us:  Please call [phone] during business hours (8-5:00 M-F).   If you have medication related questions after clinic hours, or on the weekend, please call [phone].     Financial Assistance [phone]   Medical Records [phone]       MENTAL HEALTH CRISIS RESOURCES:  For a emergency help, please call 911 or go to the nearest Emergency Department.     Emergency Walk-In Options:   [facility] Unit @ [facility] ([place]): [phone] - Specialized mental health emergency area designed to be calming  [facility] ([place]): [phone]  [facility] ([place]): [phone]  [facility]): [phone]    [facility] Crisis Information:   [place]: [phone]  [place]: [phone]  [place] (RICHARD) - Adult: [phone]     Child: [phone]  [place] - Adult: [phone]     Child: [phone]  [place]:  [phone]  List of all [facility] resources:   https://[place].gov/dhs/people-we-serve/adults/health-care/mental-health/resources/crisis-contacts.jsp    National Crisis Information:   Crisis Text Line: Text  [place]  to 431349  Suicide & Crisis Lifeline: 988  National Suicide Prevention Lifeline: 3-476-753-TALK (1-941.539.2479)       For online chat options, visit https://suicidepreventionlifeline.org/chat/  Poison Control Center: 0-342-944-7768  Trans Lifeline: 2-288-816-3319 - Hotline for transgender people of all ages  The Kt Project: 8-626-602-1417 - Hotline for LGBT youth     For Non-Emergency Support:   Fast Tracker: Mental Health & Substance Use Disorder Resources -   https://www.Zuseckappruptn.org/